# Patient Record
Sex: MALE | Race: WHITE | NOT HISPANIC OR LATINO | Employment: FULL TIME | URBAN - METROPOLITAN AREA
[De-identification: names, ages, dates, MRNs, and addresses within clinical notes are randomized per-mention and may not be internally consistent; named-entity substitution may affect disease eponyms.]

---
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
## 2022-02-20 ENCOUNTER — OFFICE VISIT (OUTPATIENT)
Dept: URGENT CARE | Facility: CLINIC | Age: 22
End: 2022-02-20
Payer: COMMERCIAL

## 2022-02-20 VITALS
BODY MASS INDEX: 22.19 KG/M2 | OXYGEN SATURATION: 93 % | DIASTOLIC BLOOD PRESSURE: 70 MMHG | RESPIRATION RATE: 16 BRPM | HEART RATE: 109 BPM | TEMPERATURE: 98.2 F | HEIGHT: 73 IN | WEIGHT: 167.4 LBS | SYSTOLIC BLOOD PRESSURE: 128 MMHG

## 2022-02-20 DIAGNOSIS — L03.314 CELLULITIS OF GROIN: ICD-10-CM

## 2022-02-20 PROCEDURE — 99213 OFFICE O/P EST LOW 20 MIN: CPT | Performed by: FAMILY MEDICINE

## 2022-02-20 RX ORDER — SULFAMETHOXAZOLE AND TRIMETHOPRIM 800; 160 MG/1; MG/1
1 TABLET ORAL 2 TIMES DAILY
Qty: 14 TABLET | Refills: 0 | Status: SHIPPED | OUTPATIENT
Start: 2022-02-20 | End: 2022-02-27

## 2022-02-20 ASSESSMENT — ENCOUNTER SYMPTOMS: FEVER: 0

## 2022-02-20 NOTE — PROGRESS NOTES
"Subjective:     Azam Myles is a 21 y.o. male who presents for Cyst (Cyst on pelvis and thigh.  The one on the pelvis area drained, but looks sore and possibly infected.)    HPI  Pt presents for evaluation of an acute problem  Pt with new \"cysts\" in his groin area   Had them appear 5 days ago   One did pop and drained dark red liquid   Has mild pain in the area     Review of Systems   Constitutional: Negative for fever.   Skin: Positive for rash.       PMH:  has no past medical history on file.  MEDS: No current outpatient medications on file.  ALLERGIES: No Known Allergies  SURGHX: No past surgical history on file.  SOCHX:  reports that he has never smoked. He has never used smokeless tobacco. He reports previous alcohol use. He reports that he does not use drugs.     Objective:   /70 (BP Location: Left arm, Patient Position: Sitting, BP Cuff Size: Adult)   Pulse (!) 109   Temp 36.8 °C (98.2 °F) (Temporal)   Resp 16   Ht 1.854 m (6' 1\")   Wt 75.9 kg (167 lb 6.4 oz)   SpO2 93%   BMI 22.09 kg/m²     Physical Exam  Constitutional:       General: He is not in acute distress.     Appearance: He is well-developed. He is not diaphoretic.   Pulmonary:      Effort: Pulmonary effort is normal.   Skin:     Comments: 2 small circular erythematous lesions which are each approximately 2 cm x 2 cm, 1 is in the left groin and the other is in the right anterior thigh.  Each has a central scabbed lesion and is without fluctuance or drainable abscess   Neurological:      Mental Status: He is alert.         Assessment/Plan:   Assessment    1. Cellulitis of groin  - sulfamethoxazole-trimethoprim (BACTRIM DS) 800-160 MG tablet; Take 1 Tablet by mouth 2 times a day for 7 days.  Dispense: 14 Tablet; Refill: 0    Patient with cellulitis of the groin.  Will treat with Bactrim as there is a chance this is MRSA.  Reviewed hygiene measures and other supportive care measures.  Follow-up as needed.    "

## 2022-03-01 ENCOUNTER — OFFICE VISIT (OUTPATIENT)
Dept: URGENT CARE | Facility: CLINIC | Age: 22
End: 2022-03-01
Payer: COMMERCIAL

## 2022-03-01 ENCOUNTER — HOSPITAL ENCOUNTER (OUTPATIENT)
Facility: MEDICAL CENTER | Age: 22
End: 2022-03-01
Attending: PHYSICIAN ASSISTANT
Payer: COMMERCIAL

## 2022-03-01 VITALS
HEIGHT: 73 IN | TEMPERATURE: 96.9 F | HEART RATE: 125 BPM | DIASTOLIC BLOOD PRESSURE: 62 MMHG | BODY MASS INDEX: 21.6 KG/M2 | OXYGEN SATURATION: 94 % | SYSTOLIC BLOOD PRESSURE: 96 MMHG | RESPIRATION RATE: 16 BRPM | WEIGHT: 163 LBS

## 2022-03-01 DIAGNOSIS — L02.91 ABSCESS: ICD-10-CM

## 2022-03-01 PROCEDURE — 10061 I&D ABSCESS COMP/MULTIPLE: CPT | Performed by: PHYSICIAN ASSISTANT

## 2022-03-01 PROCEDURE — 87077 CULTURE AEROBIC IDENTIFY: CPT

## 2022-03-01 PROCEDURE — 87070 CULTURE OTHR SPECIMN AEROBIC: CPT

## 2022-03-01 PROCEDURE — 87186 SC STD MICRODIL/AGAR DIL: CPT | Mod: 91

## 2022-03-01 PROCEDURE — 87205 SMEAR GRAM STAIN: CPT

## 2022-03-01 PROCEDURE — 99213 OFFICE O/P EST LOW 20 MIN: CPT | Mod: 25 | Performed by: PHYSICIAN ASSISTANT

## 2022-03-01 RX ORDER — DOXYCYCLINE HYCLATE 100 MG
100 TABLET ORAL 2 TIMES DAILY
Qty: 14 TABLET | Refills: 0 | Status: SHIPPED | OUTPATIENT
Start: 2022-03-01 | End: 2022-03-08

## 2022-03-02 DIAGNOSIS — L02.91 ABSCESS: ICD-10-CM

## 2022-03-02 LAB
GRAM STN SPEC: NORMAL
SIGNIFICANT IND 70042: NORMAL
SITE SITE: NORMAL
SOURCE SOURCE: NORMAL

## 2022-03-02 ASSESSMENT — ENCOUNTER SYMPTOMS
MYALGIAS: 0
VOMITING: 0
FEVER: 0
CHILLS: 0
NAUSEA: 0
HEADACHES: 0

## 2022-03-02 NOTE — PROGRESS NOTES
"Karan Myles is a 21 y.o. male who presents with Cyst (On bottom, x1.5weeks/Redness and a stinging pain/)            Cyst  This is a new problem. Episode onset: 1.5 weeks. Left buttock  The problem has been gradually worsening. Pertinent negatives include no chills, fever, headaches, myalgias, nausea or vomiting. Associated symptoms comments: Redness, swelling, pain in left buttock . Exacerbated by: sitting or palpation. Treatments tried: recently on antibiotics for two other cysts-  The treatment provided no relief.       No past medical history on file.    No past surgical history on file.    No family history on file.    No Known Allergies    Medications, Allergies, and current problem list reviewed today in Epic      Review of Systems   Constitutional: Negative for chills, fever and malaise/fatigue.   Gastrointestinal: Negative for nausea and vomiting.   Musculoskeletal: Negative for myalgias.   Skin:        Swelling, redness, abscess on left buttock    Neurological: Negative for headaches.          All other systems reviewed and are negative.       Objective     BP (!) 96/62   Pulse (!) 125   Temp 36.1 °C (96.9 °F) (Temporal)   Resp 16   Ht 1.854 m (6' 1\")   Wt 73.9 kg (163 lb)   SpO2 94%   BMI 21.51 kg/m²      Physical Exam  Constitutional:       General: He is not in acute distress.     Appearance: He is not ill-appearing.   HENT:      Head: Normocephalic and atraumatic.   Eyes:      Conjunctiva/sclera: Conjunctivae normal.   Cardiovascular:      Rate and Rhythm: Regular rhythm. Tachycardia present.   Pulmonary:      Effort: Pulmonary effort is normal. No respiratory distress.      Breath sounds: No wheezing.   Skin:         Neurological:      General: No focal deficit present.      Mental Status: He is alert and oriented to person, place, and time.   Psychiatric:         Mood and Affect: Mood is anxious.         Behavior: Behavior normal.         Thought Content: Thought content " "normal.         Judgment: Judgment normal.                Procedure: Incision and Drainage  -Risks, benefits, and alternatives discussed. Risks including infection, bleeding, nerve damage, and poor cosmetic outcome  -Sterile technique throughout  -Local anesthesia with 2% lidocaine with epinephrine  -Incision with #11 blade into fluctuant area with purulent material expressed  -Culture obtained and packaged for lab  -Cavity probed and any loculations bluntly taken down with hemostat  -Irrigated copiously with NS  -Packed with 1/4\" gauze  -Minimal bleeding with good hemostasis achieved  -The patient tolerated the procedure well                 Assessment & Plan        1. Abscess    I&D  - CULTURE WOUND W/ GRAM STAIN; Future  - doxycycline (VIBRAMYCIN) 100 MG Tab; Take 1 Tablet by mouth 2 times a day for 7 days.  Dispense: 14 Tablet; Refill: 0    Wound care discussed  Advised RTC in 2 days.    Differential diagnoses, Supportive care, and indications for immediate follow-up discussed with patient.   Pathogenesis of diagnosis discussed including typical length and natural progression.   Instructed to return to clinic or nearest emergency department for any change in condition, further concerns, or worsening of symptoms.    The patient demonstrated a good understanding and agreed with the treatment plan.    Antonella Mcginnis P.A.-C.                "

## 2022-03-03 ENCOUNTER — OFFICE VISIT (OUTPATIENT)
Dept: URGENT CARE | Facility: CLINIC | Age: 22
End: 2022-03-03
Payer: COMMERCIAL

## 2022-03-03 VITALS
OXYGEN SATURATION: 99 % | HEIGHT: 73 IN | BODY MASS INDEX: 21.6 KG/M2 | DIASTOLIC BLOOD PRESSURE: 82 MMHG | SYSTOLIC BLOOD PRESSURE: 130 MMHG | HEART RATE: 112 BPM | WEIGHT: 163 LBS | TEMPERATURE: 98.7 F | RESPIRATION RATE: 20 BRPM

## 2022-03-03 DIAGNOSIS — L02.91 ABSCESS: ICD-10-CM

## 2022-03-03 PROCEDURE — 99213 OFFICE O/P EST LOW 20 MIN: CPT | Performed by: NURSE PRACTITIONER

## 2022-03-03 ASSESSMENT — ENCOUNTER SYMPTOMS
CARDIOVASCULAR NEGATIVE: 1
GASTROINTESTINAL NEGATIVE: 1
PSYCHIATRIC NEGATIVE: 1
EYES NEGATIVE: 1
CONSTITUTIONAL NEGATIVE: 1
NEUROLOGICAL NEGATIVE: 1
ROS SKIN COMMENTS: ABSCESS
MUSCULOSKELETAL NEGATIVE: 1
RESPIRATORY NEGATIVE: 1

## 2022-03-03 NOTE — PROGRESS NOTES
"Subjective:   Azam Myles is a 21 y.o. male who presents for Follow-Up (Cyst x 1.5 weeks, FU)       HPI  Patient presents for evaluation of left buttock abscess.  Patient was seen in the urgent care on 3/1 for same abscess, had incision and drainage, was prescribed doxycycline.  Patient has taken doxycycline as prescribed without any difficulties.  Reports slight improvement in pain, has continued bloody drainage.  Denies fever or chills.    Review of Systems   Constitutional: Negative.    HENT: Negative.    Eyes: Negative.    Respiratory: Negative.    Cardiovascular: Negative.    Gastrointestinal: Negative.    Genitourinary: Negative.    Musculoskeletal: Negative.    Skin:        abscess    Neurological: Negative.    Psychiatric/Behavioral: Negative.    All other systems reviewed and are negative.      MEDS:   Current Outpatient Medications:   •  doxycycline (VIBRAMYCIN) 100 MG Tab, Take 1 Tablet by mouth 2 times a day for 7 days., Disp: 14 Tablet, Rfl: 0  ALLERGIES: No Known Allergies    Patient's PMH, SocHx, SurgHx, FamHx, Drug allergies and medications were reviewed.     Objective:   /82 (BP Location: Left arm, Patient Position: Sitting, BP Cuff Size: Adult long)   Pulse (!) 112   Temp 37.1 °C (98.7 °F) (Temporal)   Resp 20   Ht 1.854 m (6' 1\")   Wt 73.9 kg (163 lb)   SpO2 99%   BMI 21.51 kg/m²     Physical Exam  Vitals and nursing note reviewed.   Constitutional:       General: He is awake.      Appearance: Normal appearance. He is well-developed.   HENT:      Head: Normocephalic and atraumatic.      Right Ear: External ear normal.      Left Ear: External ear normal.      Nose: Nose normal.      Mouth/Throat:      Lips: Pink.      Mouth: Mucous membranes are moist.      Pharynx: Oropharynx is clear.   Eyes:      General: Lids are normal.      Extraocular Movements: Extraocular movements intact.      Conjunctiva/sclera: Conjunctivae normal.   Cardiovascular:      Rate and Rhythm: Normal rate and " regular rhythm.   Pulmonary:      Effort: Pulmonary effort is normal.   Musculoskeletal:         General: Normal range of motion.      Cervical back: Normal range of motion.   Skin:     General: Skin is warm and dry.      Findings: Abscess and erythema present.             Comments: Abscess as noted with slight incision, but approximate 3 cm annular in total size.  Large amount of packing removed with serosanguineous and bloody drainage.  No purulence present.   Neurological:      Mental Status: He is alert and oriented to person, place, and time.   Psychiatric:         Mood and Affect: Mood normal.         Behavior: Behavior normal. Behavior is cooperative.         Thought Content: Thought content normal.         Judgment: Judgment normal.         Assessment/Plan:   Assessment    1. Abscess  - Referral to General Surgery    Vital signs stable at today's acute urgent care visit.  Packing wound as described above.  Wound copiously irrigated and repacked.  Extra dressing supplies provided to patient.  Continue antibiotics as previously prescribed.  We will refer the patient to general surgery due to extensiveness of wound and undermining.      Advised the patient to follow-up with the primary care provider for recheck, reevaluation, and/or consideration of further management. Return to urgent care with any worsening/continued symptoms.  Red flags discussed and indications to immediately call 911 or present to the ED.  All questions were encouraged and answered to the patient's satisfaction and understanding, and they agree to the plan of care.     I personally reviewed prior external notes and test results pertinent to today's visit.  I have independently reviewed and interpreted all diagnostics ordered during this urgent care acute visit. Time spent evaluating this patient was a minimum of 20 minutes and includes preparing for visit, counseling/education, exam, evaluation, obtaining history, and ordering  lab/test/procedures.      Please note that this dictation was created using voice recognition software. I have made a reasonable attempt to correct obvious errors, but I expect that there are errors of grammar and possibly content that I did not discover before finalizing the note.

## 2022-03-05 ENCOUNTER — OFFICE VISIT (OUTPATIENT)
Dept: URGENT CARE | Facility: CLINIC | Age: 22
End: 2022-03-05
Payer: COMMERCIAL

## 2022-03-05 VITALS
BODY MASS INDEX: 22.13 KG/M2 | DIASTOLIC BLOOD PRESSURE: 82 MMHG | HEIGHT: 73 IN | RESPIRATION RATE: 18 BRPM | WEIGHT: 167 LBS | TEMPERATURE: 97.9 F | OXYGEN SATURATION: 99 % | SYSTOLIC BLOOD PRESSURE: 126 MMHG | HEART RATE: 101 BPM

## 2022-03-05 DIAGNOSIS — Z51.89 WOUND CHECK, ABSCESS: ICD-10-CM

## 2022-03-05 LAB
BACTERIA WND AEROBE CULT: ABNORMAL
GRAM STN SPEC: ABNORMAL
SIGNIFICANT IND 70042: ABNORMAL
SITE SITE: ABNORMAL
SOURCE SOURCE: ABNORMAL

## 2022-03-05 PROCEDURE — 99024 POSTOP FOLLOW-UP VISIT: CPT | Performed by: NURSE PRACTITIONER

## 2022-03-06 PROBLEM — R55 SYNCOPE AND COLLAPSE: Status: ACTIVE | Noted: 2022-03-06

## 2022-03-06 PROBLEM — J45.20: Status: ACTIVE | Noted: 2022-03-06

## 2022-03-06 PROBLEM — Z91.09 ENVIRONMENTAL ALLERGIES: Status: ACTIVE | Noted: 2022-03-06

## 2022-03-06 ASSESSMENT — ENCOUNTER SYMPTOMS
SORE THROAT: 0
DIZZINESS: 0
SHORTNESS OF BREATH: 0
EYE PAIN: 0
VOMITING: 0
FEVER: 0
CHILLS: 0
NAUSEA: 0
MYALGIAS: 0

## 2022-03-06 NOTE — PROGRESS NOTES
"Subjective:   Azam Myles is a 21 y.o. male who presents for Cyst (Left glute/Cyst follow up )      Wound Check  He was originally treated 3 to 5 days ago. Previous treatment included oral antibiotics and I&D of abscess. The maximum temperature noted was less than 100.4 F. The temperature was taken using a tympanic thermometer. There has been bloody discharge from the wound. The redness has improved. The swelling has improved. The pain has improved. He has no difficulty moving the affected extremity or digit.       Review of Systems   Constitutional: Negative for chills and fever.   HENT: Negative for sore throat.    Eyes: Negative for pain.   Respiratory: Negative for shortness of breath.    Cardiovascular: Negative for chest pain.   Gastrointestinal: Negative for nausea and vomiting.   Genitourinary: Negative for hematuria.   Musculoskeletal: Negative for myalgias.   Skin: Negative for rash.        Abscess left buttock with bandage in place and packing   Neurological: Negative for dizziness.       Medications:    • doxycycline Tabs    Allergies: Patient has no known allergies.    Problem List: Azam Myles does not have a problem list on file.    Surgical History:  No past surgical history on file.    Past Social Hx: Azam Myles  reports that he has never smoked. He has never used smokeless tobacco. He reports previous alcohol use. He reports that he does not use drugs.     Past Family Hx:  Azam Myles family history is not on file.     Problem list, medications, and allergies reviewed by myself today in Epic.     Objective:     /82 (BP Location: Left arm, Patient Position: Sitting)   Pulse (!) 101   Temp 36.6 °C (97.9 °F) (Temporal)   Resp 18   Ht 1.854 m (6' 1\")   Wt 75.8 kg (167 lb)   SpO2 99%   BMI 22.03 kg/m²     Physical Exam  Constitutional:       Appearance: Normal appearance. He is not ill-appearing or toxic-appearing.   HENT:      Head: Normocephalic.      Right Ear: External ear " normal.      Left Ear: External ear normal.      Nose: Nose normal.      Mouth/Throat:      Lips: Pink.      Mouth: Mucous membranes are moist.   Eyes:      General: Lids are normal.         Right eye: No discharge.         Left eye: No discharge.   Pulmonary:      Effort: Pulmonary effort is normal. No accessory muscle usage or respiratory distress.   Musculoskeletal:         General: Normal range of motion.      Cervical back: Full passive range of motion without pain.   Skin:     Coloration: Skin is not pale.      Findings: Abscess present.          Neurological:      Mental Status: He is alert and oriented to person, place, and time.   Psychiatric:         Mood and Affect: Mood normal.         Thought Content: Thought content normal.         Assessment/Plan:     Diagnosis and associated orders:     1. Wound check, abscess        Comments/MDM:     • Patient is a 21-year-old male present with the stated above, packing was removed, did irrigate wound with copious amounts of NS.  Did use 1 cc lidocaine with epi irrigated and the wound prior to repacking.  Patient will return to clinic in 48 hours for wound check.  Advised to continue taking prescribed antibiotics as directed as they are sensitive to bacteria Staphylococcus Lugdunensis and epidermidis which ws positive on culture.   • Differential diagnosis, natural history, supportive care, and indications for immediate follow-up discussed.                  Please note that this dictation was created using voice recognition software. I have made a reasonable attempt to correct obvious errors, but I expect that there are errors of grammar and possibly content that I did not discover before finalizing the note.    This note was electronically signed by Earl VEE

## 2022-03-07 ENCOUNTER — OFFICE VISIT (OUTPATIENT)
Dept: URGENT CARE | Facility: CLINIC | Age: 22
End: 2022-03-07
Payer: COMMERCIAL

## 2022-03-07 VITALS
TEMPERATURE: 97.6 F | DIASTOLIC BLOOD PRESSURE: 66 MMHG | OXYGEN SATURATION: 95 % | BODY MASS INDEX: 21.66 KG/M2 | WEIGHT: 163.4 LBS | RESPIRATION RATE: 16 BRPM | HEART RATE: 111 BPM | SYSTOLIC BLOOD PRESSURE: 124 MMHG | HEIGHT: 73 IN

## 2022-03-07 DIAGNOSIS — Z51.89 WOUND CHECK, ABSCESS: ICD-10-CM

## 2022-03-07 PROCEDURE — 99024 POSTOP FOLLOW-UP VISIT: CPT | Performed by: PHYSICIAN ASSISTANT

## 2022-03-07 ASSESSMENT — ENCOUNTER SYMPTOMS
CHILLS: 0
MYALGIAS: 0
VOMITING: 0
FEVER: 0
NAUSEA: 0

## 2022-03-07 NOTE — PROGRESS NOTES
"Subjective:   Azam Myles is a 21 y.o. male who presents for Follow-Up (Cyst packing left cheek, last here, swelling gone done, pain as gone down significantly x 3 days)      Wound Check  He was originally treated 5 to 10 days ago (I&D of abscess to left gluteal region.  Symptoms have greatly improved.  No pain.  No discharge.  No fevers, chills or myalgias.). Previous treatment included I&D of abscess, oral antibiotics and wound cleansing or irrigation. His temperature was unmeasured prior to arrival. There has been no drainage from the wound. There is no redness present. There is no swelling present. There is no pain present. He has no difficulty moving the affected extremity or digit.       Review of Systems   Constitutional: Negative for chills and fever.   Gastrointestinal: Negative for nausea and vomiting.   Musculoskeletal: Negative for myalgias.   Skin:        Healing abscess with I&D to left gluteal region       Medications:    • doxycycline Tabs    Allergies: Patient has no known allergies.    Problem List: Azam Myles does not have any pertinent problems on file.    Surgical History:  No past surgical history on file.    Past Social Hx: Azam Myles  reports that he has never smoked. He has never used smokeless tobacco. He reports previous alcohol use. He reports that he does not use drugs.     Past Family Hx:  Azam Myles family history is not on file.     Problem list, medications, and allergies reviewed by myself today in Epic.     Objective:     /66 (BP Location: Left arm, Patient Position: Sitting, BP Cuff Size: Adult)   Pulse (!) 111   Temp 36.4 °C (97.6 °F) (Temporal)   Resp 16   Ht 1.854 m (6' 1\")   Wt 74.1 kg (163 lb 6.4 oz)   SpO2 95%   BMI 21.56 kg/m²     Physical Exam  Constitutional:       General: He is not in acute distress.     Appearance: Normal appearance. He is not ill-appearing, toxic-appearing or diaphoretic.   HENT:      Head: Normocephalic and atraumatic. "   Eyes:      Conjunctiva/sclera: Conjunctivae normal.   Pulmonary:      Effort: Pulmonary effort is normal.   Musculoskeletal:      Cervical back: Normal range of motion.   Skin:     Comments: Abscess with healing incision to left gluteal region there is no surrounding redness.  Packing was removed in clinic with no additional purulent drainage or discharge present.  No surrounding induration.  No underlying fluctuance.   Neurological:      General: No focal deficit present.      Mental Status: He is alert and oriented to person, place, and time. Mental status is at baseline.           Assessment/Plan:     Comments/MDM:     • Packing removed in clinic.  Wound was thoroughly irrigated with normal saline.  Patient tolerated this well with no complications.  No underlying fluctuance present.  No discharge or drainage.  No surrounding redness or induration.  Continued wound care instructions discussed.  Sterile dressing applied.  Continue on current antibiotic regimen.  Follow-up for any worsening symptoms.     Diagnosis and associated orders:     1. Wound check, abscess              Differential diagnosis, natural history, supportive care, and indications for immediate follow-up discussed.    I personally reviewed prior external notes and test results pertinent to today's visit.     Advised the patient to follow-up with the primary care physician for recheck, reevaluation, and consideration of further management.    Please note that this dictation was created using voice recognition software. I have made reasonable attempt to correct obvious errors, but I expect that there are errors of grammar and possibly content that I did not discover before finalizing the note.    This note was electronically signed by JELANI Booker PA-C   Carlo CONNORS

## 2022-05-24 ENCOUNTER — TELEPHONE (OUTPATIENT)
Dept: SCHEDULING | Facility: IMAGING CENTER | Age: 22
End: 2022-05-24
Payer: COMMERCIAL

## 2022-06-07 ENCOUNTER — HOSPITAL ENCOUNTER (EMERGENCY)
Facility: MEDICAL CENTER | Age: 22
End: 2022-06-07
Payer: COMMERCIAL

## 2022-06-07 ENCOUNTER — OFFICE VISIT (OUTPATIENT)
Dept: MEDICAL GROUP | Facility: PHYSICIAN GROUP | Age: 22
End: 2022-06-07
Payer: COMMERCIAL

## 2022-06-07 VITALS
BODY MASS INDEX: 21.71 KG/M2 | DIASTOLIC BLOOD PRESSURE: 64 MMHG | HEART RATE: 92 BPM | WEIGHT: 163.8 LBS | RESPIRATION RATE: 16 BRPM | HEIGHT: 73 IN | TEMPERATURE: 97.9 F | SYSTOLIC BLOOD PRESSURE: 120 MMHG | OXYGEN SATURATION: 96 %

## 2022-06-07 DIAGNOSIS — R73.09 ELEVATED GLUCOSE: ICD-10-CM

## 2022-06-07 DIAGNOSIS — R35.0 URINARY FREQUENCY: ICD-10-CM

## 2022-06-07 DIAGNOSIS — R82.4 KETONURIA: ICD-10-CM

## 2022-06-07 PROBLEM — Z91.09 ENVIRONMENTAL ALLERGIES: Status: RESOLVED | Noted: 2022-03-06 | Resolved: 2022-06-07

## 2022-06-07 PROBLEM — J45.20: Status: RESOLVED | Noted: 2022-03-06 | Resolved: 2022-06-07

## 2022-06-07 LAB
APPEARANCE UR: CLEAR
BILIRUB UR STRIP-MCNC: NEGATIVE MG/DL
COLOR UR AUTO: YELLOW
GLUCOSE BLD-MCNC: NORMAL MG/DL (ref 70–100)
GLUCOSE UR STRIP.AUTO-MCNC: 1000 MG/DL
HBA1C MFR BLD: NORMAL % (ref ?–5.8)
INT CON NEG: NORMAL
INT CON POS: NORMAL
KETONES UR STRIP.AUTO-MCNC: 80 MG/DL
LEUKOCYTE ESTERASE UR QL STRIP.AUTO: NEGATIVE
NITRITE UR QL STRIP.AUTO: NEGATIVE
PH UR STRIP.AUTO: 5.5 [PH] (ref 5–8)
PROT UR QL STRIP: NEGATIVE MG/DL
RBC UR QL AUTO: NEGATIVE
SP GR UR STRIP.AUTO: 1
UROBILINOGEN UR STRIP-MCNC: 0.2 MG/DL

## 2022-06-07 PROCEDURE — 82962 GLUCOSE BLOOD TEST: CPT

## 2022-06-07 PROCEDURE — 83036 HEMOGLOBIN GLYCOSYLATED A1C: CPT

## 2022-06-07 PROCEDURE — 99214 OFFICE O/P EST MOD 30 MIN: CPT

## 2022-06-07 PROCEDURE — 81002 URINALYSIS NONAUTO W/O SCOPE: CPT

## 2022-06-07 ASSESSMENT — ENCOUNTER SYMPTOMS
PALPITATIONS: 0
DIAPHORESIS: 0
FLANK PAIN: 0
WEIGHT LOSS: 0
DOUBLE VISION: 0
BLURRED VISION: 0
CHILLS: 0
FOCAL WEAKNESS: 0
SPEECH CHANGE: 0
CONSTIPATION: 0
DIARRHEA: 0
FEVER: 0
ABDOMINAL PAIN: 0
VOMITING: 0
NAUSEA: 0
WEAKNESS: 0
SHORTNESS OF BREATH: 0
DIZZINESS: 0
SENSORY CHANGE: 0

## 2022-06-07 ASSESSMENT — PATIENT HEALTH QUESTIONNAIRE - PHQ9: CLINICAL INTERPRETATION OF PHQ2 SCORE: 0

## 2022-06-07 NOTE — PROGRESS NOTES
Subjective:     CC:  Diagnoses of Urinary frequency, Elevated glucose, and Ketonuria were pertinent to this visit.    HISTORY OF THE PRESENT ILLNESS: Patient is a 21 y.o. male. This pleasant patient is here today to establish care and discuss the following problems:    Problem   Urinary Frequency    6-month history of urinary frequency.  Patient drinks approximately 64+ ounces of water per day.  He does not report to be excessively thirsty.  He denies any previous history of urinary tract infection.  He reports to urinate over 10 times a day and about 5 times at night.  He is in a monogamous relationship and religiously uses condoms for sexual intercourse.  He does not endorse penile or testicular pain.  Denies dysuria however he has had 2 incidents of this since this condition began.  He does report that in the last 6 months he has had COVID-19, the flu, and gastroenteritis.  He does not endorse current polydipsia or polyphagia, no weakness or fatigue, no visual changes, or mental status changes.     Elevated Glucose    This is a new condition of uncertain prognosis.  Serum glucose collected in clinic too high to register on device.  Patient denies any polydipsia or polyphagia.  He is not tachycardic nor tachypneic.  Recent history of multiple viral illnesses in the past 6 months including COVID-19, influenza, and gastroenteritis.  Positive family history of diabetes in maternal grandmother and great-grandmother.  UA came back positive for greater than 1000 glucose and 80 mg of ketones per deciliter.     Ketonuria    This is a new condition of uncertain prognosis.  Positive ketonuria with 80 mg of ketones per deciliter collected in UA.  Glucose in urine above 1000.  POCT collected in clinic greater than meter could read.     Environmental Allergies (Resolved)   Pediatric Asthma, Mild Intermittent, Uncomplicated (Resolved)       Health Maintenance: We will bring back for health maintenance visit    ROS:   Review of  "Systems   Constitutional: Negative for chills, diaphoresis, fever, malaise/fatigue and weight loss.   Eyes: Negative for blurred vision and double vision.   Respiratory: Negative for shortness of breath.    Cardiovascular: Negative for chest pain and palpitations.   Gastrointestinal: Negative for abdominal pain, constipation, diarrhea, nausea and vomiting.   Genitourinary: Positive for frequency and urgency. Negative for dysuria, flank pain and hematuria.   Neurological: Negative for dizziness, sensory change, speech change, focal weakness and weakness.         Objective:     Exam: /64 (BP Location: Left arm, Patient Position: Sitting, BP Cuff Size: Adult)   Pulse 92   Temp 36.6 °C (97.9 °F) (Temporal)   Resp 16   Ht 1.842 m (6' 0.5\")   Wt 74.3 kg (163 lb 12.8 oz)   SpO2 96%  Body mass index is 21.91 kg/m².    Physical Exam  Constitutional:       General: He is not in acute distress.     Appearance: Normal appearance. He is normal weight. He is not ill-appearing or toxic-appearing.   HENT:      Head: Normocephalic and atraumatic.      Right Ear: There is impacted cerumen.      Left Ear: There is impacted cerumen.      Nose: Nose normal.      Mouth/Throat:      Mouth: Mucous membranes are moist.      Pharynx: Oropharynx is clear. No posterior oropharyngeal erythema.   Eyes:      Extraocular Movements: Extraocular movements intact.      Conjunctiva/sclera: Conjunctivae normal.      Pupils: Pupils are equal, round, and reactive to light.   Cardiovascular:      Rate and Rhythm: Normal rate and regular rhythm.      Pulses: Normal pulses.      Heart sounds: Normal heart sounds. No murmur heard.    No friction rub. No gallop.   Pulmonary:      Effort: Pulmonary effort is normal. No respiratory distress.      Breath sounds: Normal breath sounds.   Musculoskeletal:         General: Normal range of motion.      Cervical back: Normal range of motion and neck supple.   Lymphadenopathy:      Cervical: No cervical " adenopathy.   Skin:     General: Skin is warm and dry.      Capillary Refill: Capillary refill takes less than 2 seconds.   Neurological:      General: No focal deficit present.      Mental Status: He is alert and oriented to person, place, and time.   Psychiatric:         Mood and Affect: Mood normal.         Behavior: Behavior normal.       Labs: UA collected see HPI, POCT hemoglobin A1c and glucose too high to register on clinic device    Assessment & Plan: Medical Decision Making   21 y.o. male with the following -    Problem List Items Addressed This Visit     Urinary frequency     New condition of uncertain prognosis  - urine specimen collected in clinic shows glucose level over thousand, ketones 80 mg/dL, no blood, pH 5.5, protein negative, nitrite and leukoesterase negative.  -POCT hemoglobin A1c and glucose collected in clinic readings too high to register  -Urgent referral to ER           Relevant Orders    CBC WITHOUT DIFFERENTIAL    Comp Metabolic Panel    TSH WITH REFLEX TO FT4    VITAMIN D,25 HYDROXY    INSULIN AND C-PEPTIDE, SERUM    Referral to Endocrinology    POCT Hemoglobin A1C (Completed)    POCT Glucose (Completed)    POCT Urinalysis (Completed)    Elevated glucose     This is a new condition of uncertain prognosis  - POCT hemoglobin A1c and glucose too high for meter to read  - Urgent referral to emergency department           Relevant Orders    CBC WITHOUT DIFFERENTIAL    Comp Metabolic Panel    TSH WITH REFLEX TO FT4    VITAMIN D,25 HYDROXY    INSULIN AND C-PEPTIDE, SERUM    Referral to Endocrinology    POCT Hemoglobin A1C (Completed)    POCT Glucose (Completed)    Ketonuria     This is a new condition of uncertain prognosis  - Recommend urgent referral to ER for treatment           Relevant Orders    CBC WITHOUT DIFFERENTIAL    Comp Metabolic Panel    TSH WITH REFLEX TO FT4    VITAMIN D,25 HYDROXY    INSULIN AND C-PEPTIDE, SERUM    Referral to Endocrinology    POCT Hemoglobin A1C (Completed)     POCT Glucose (Completed)          Differential diagnosis, natural history, supportive care, and indications for immediate follow-up discussed.  Shared decision making approach was utilized, and patient is amendable with plan of care.  Patient understands to return to clinic or go to the emergency department if symptoms worsen. All questions and concerns addressed.      Return in about 2 weeks (around 6/21/2022) for Diabetes.    Please note that this dictation was created using voice recognition software. I have made every reasonable attempt to correct obvious errors, but I expect that there are errors of grammar and possibly content that I did not discover before finalizing the note.

## 2022-06-07 NOTE — ASSESSMENT & PLAN NOTE
New condition of uncertain prognosis  - urine specimen collected in clinic shows glucose level over thousand, ketones 80 mg/dL, no blood, pH 5.5, protein negative, nitrite and leukoesterase negative.  -POCT hemoglobin A1c and glucose collected in clinic readings too high to register  -Urgent referral to ER

## 2022-06-07 NOTE — ASSESSMENT & PLAN NOTE
This is a new condition of uncertain prognosis  - POCT hemoglobin A1c and glucose too high for meter to read  - Urgent referral to emergency department

## 2022-06-08 ENCOUNTER — HOSPITAL ENCOUNTER (OUTPATIENT)
Facility: MEDICAL CENTER | Age: 22
End: 2022-06-10
Attending: EMERGENCY MEDICINE | Admitting: STUDENT IN AN ORGANIZED HEALTH CARE EDUCATION/TRAINING PROGRAM
Payer: COMMERCIAL

## 2022-06-08 DIAGNOSIS — E11.9 DIABETES MELLITUS, NEW ONSET (HCC): ICD-10-CM

## 2022-06-08 PROBLEM — R73.9 HYPERGLYCEMIA: Status: ACTIVE | Noted: 2022-06-08

## 2022-06-08 LAB
ALBUMIN SERPL BCP-MCNC: 4.3 G/DL (ref 3.2–4.9)
ALBUMIN/GLOB SERPL: 1.6 G/DL
ALP SERPL-CCNC: 157 U/L (ref 30–99)
ALT SERPL-CCNC: 27 U/L (ref 2–50)
AMPHET UR QL SCN: NEGATIVE
ANION GAP SERPL CALC-SCNC: 19 MMOL/L (ref 7–16)
APPEARANCE UR: CLEAR
AST SERPL-CCNC: 17 U/L (ref 12–45)
BARBITURATES UR QL SCN: NEGATIVE
BASOPHILS # BLD AUTO: 1.3 % (ref 0–1.8)
BASOPHILS # BLD: 0.05 K/UL (ref 0–0.12)
BENZODIAZ UR QL SCN: NEGATIVE
BILIRUB SERPL-MCNC: 0.5 MG/DL (ref 0.1–1.5)
BILIRUB UR QL STRIP.AUTO: ABNORMAL
BUN SERPL-MCNC: 15 MG/DL (ref 8–22)
BZE UR QL SCN: NEGATIVE
CALCIUM SERPL-MCNC: 9.1 MG/DL (ref 8.4–10.2)
CANNABINOIDS UR QL SCN: NEGATIVE
CHLORIDE SERPL-SCNC: 98 MMOL/L (ref 96–112)
CO2 SERPL-SCNC: 20 MMOL/L (ref 20–33)
COLOR UR: YELLOW
CREAT SERPL-MCNC: 0.77 MG/DL (ref 0.5–1.4)
EOSINOPHIL # BLD AUTO: 0.06 K/UL (ref 0–0.51)
EOSINOPHIL NFR BLD: 1.5 % (ref 0–6.9)
ERYTHROCYTE [DISTWIDTH] IN BLOOD BY AUTOMATED COUNT: 40 FL (ref 35.9–50)
EST. AVERAGE GLUCOSE BLD GHB EST-MCNC: 444 MG/DL
GFR SERPLBLD CREATININE-BSD FMLA CKD-EPI: 132 ML/MIN/1.73 M 2
GLOBULIN SER CALC-MCNC: 2.7 G/DL (ref 1.9–3.5)
GLUCOSE BLD STRIP.AUTO-MCNC: 207 MG/DL (ref 65–99)
GLUCOSE SERPL-MCNC: 274 MG/DL (ref 65–99)
GLUCOSE UR STRIP.AUTO-MCNC: >=1000 MG/DL
HBA1C MFR BLD: 17.1 % (ref 4–5.6)
HCT VFR BLD AUTO: 43.6 % (ref 42–52)
HGB BLD-MCNC: 15.3 G/DL (ref 14–18)
IMM GRANULOCYTES # BLD AUTO: 0.01 K/UL (ref 0–0.11)
IMM GRANULOCYTES NFR BLD AUTO: 0.3 % (ref 0–0.9)
KETONES UR STRIP.AUTO-MCNC: >=80 MG/DL
LEUKOCYTE ESTERASE UR QL STRIP.AUTO: NEGATIVE
LIPASE SERPL-CCNC: 17 U/L (ref 7–58)
LYMPHOCYTES # BLD AUTO: 1.67 K/UL (ref 1–4.8)
LYMPHOCYTES NFR BLD: 42.5 % (ref 22–41)
MCH RBC QN AUTO: 33 PG (ref 27–33)
MCHC RBC AUTO-ENTMCNC: 35.1 G/DL (ref 33.7–35.3)
MCV RBC AUTO: 94 FL (ref 81.4–97.8)
METHADONE UR QL SCN: NEGATIVE
MICRO URNS: ABNORMAL
MONOCYTES # BLD AUTO: 0.32 K/UL (ref 0–0.85)
MONOCYTES NFR BLD AUTO: 8.1 % (ref 0–13.4)
NEUTROPHILS # BLD AUTO: 1.82 K/UL (ref 1.82–7.42)
NEUTROPHILS NFR BLD: 46.3 % (ref 44–72)
NITRITE UR QL STRIP.AUTO: NEGATIVE
NRBC # BLD AUTO: 0 K/UL
NRBC BLD-RTO: 0 /100 WBC
OPIATES UR QL SCN: NEGATIVE
OXYCODONE UR QL SCN: NEGATIVE
PCP UR QL SCN: NEGATIVE
PH UR STRIP.AUTO: 5 [PH] (ref 5–8)
PLATELET # BLD AUTO: 178 K/UL (ref 164–446)
PMV BLD AUTO: 10 FL (ref 9–12.9)
POTASSIUM SERPL-SCNC: 4.3 MMOL/L (ref 3.6–5.5)
PROPOXYPH UR QL SCN: NEGATIVE
PROT SERPL-MCNC: 7 G/DL (ref 6–8.2)
PROT UR QL STRIP: NEGATIVE MG/DL
RBC # BLD AUTO: 4.64 M/UL (ref 4.7–6.1)
RBC UR QL AUTO: NEGATIVE
SODIUM SERPL-SCNC: 137 MMOL/L (ref 135–145)
SP GR UR STRIP.AUTO: 1.02
WBC # BLD AUTO: 3.9 K/UL (ref 4.8–10.8)

## 2022-06-08 PROCEDURE — 36415 COLL VENOUS BLD VENIPUNCTURE: CPT

## 2022-06-08 PROCEDURE — 82010 KETONE BODYS QUAN: CPT

## 2022-06-08 PROCEDURE — 700105 HCHG RX REV CODE 258: Performed by: EMERGENCY MEDICINE

## 2022-06-08 PROCEDURE — 83036 HEMOGLOBIN GLYCOSYLATED A1C: CPT

## 2022-06-08 PROCEDURE — 84681 ASSAY OF C-PEPTIDE: CPT

## 2022-06-08 PROCEDURE — 96372 THER/PROPH/DIAG INJ SC/IM: CPT

## 2022-06-08 PROCEDURE — 80053 COMPREHEN METABOLIC PANEL: CPT

## 2022-06-08 PROCEDURE — 81003 URINALYSIS AUTO W/O SCOPE: CPT

## 2022-06-08 PROCEDURE — 700102 HCHG RX REV CODE 250 W/ 637 OVERRIDE(OP): Performed by: STUDENT IN AN ORGANIZED HEALTH CARE EDUCATION/TRAINING PROGRAM

## 2022-06-08 PROCEDURE — 80307 DRUG TEST PRSMV CHEM ANLYZR: CPT

## 2022-06-08 PROCEDURE — 83690 ASSAY OF LIPASE: CPT

## 2022-06-08 PROCEDURE — 85025 COMPLETE CBC W/AUTO DIFF WBC: CPT

## 2022-06-08 PROCEDURE — G0378 HOSPITAL OBSERVATION PER HR: HCPCS

## 2022-06-08 PROCEDURE — 700105 HCHG RX REV CODE 258: Performed by: STUDENT IN AN ORGANIZED HEALTH CARE EDUCATION/TRAINING PROGRAM

## 2022-06-08 PROCEDURE — 99219 PR INITIAL OBSERVATION CARE,LEVL II: CPT | Performed by: STUDENT IN AN ORGANIZED HEALTH CARE EDUCATION/TRAINING PROGRAM

## 2022-06-08 PROCEDURE — 99285 EMERGENCY DEPT VISIT HI MDM: CPT

## 2022-06-08 PROCEDURE — 700111 HCHG RX REV CODE 636 W/ 250 OVERRIDE (IP): Performed by: STUDENT IN AN ORGANIZED HEALTH CARE EDUCATION/TRAINING PROGRAM

## 2022-06-08 PROCEDURE — 82962 GLUCOSE BLOOD TEST: CPT

## 2022-06-08 RX ORDER — ONDANSETRON 4 MG/1
4 TABLET, ORALLY DISINTEGRATING ORAL EVERY 4 HOURS PRN
Status: DISCONTINUED | OUTPATIENT
Start: 2022-06-08 | End: 2022-06-10 | Stop reason: HOSPADM

## 2022-06-08 RX ORDER — PROMETHAZINE HYDROCHLORIDE 25 MG/1
12.5-25 TABLET ORAL EVERY 4 HOURS PRN
Status: DISCONTINUED | OUTPATIENT
Start: 2022-06-08 | End: 2022-06-10 | Stop reason: HOSPADM

## 2022-06-08 RX ORDER — BISACODYL 10 MG
10 SUPPOSITORY, RECTAL RECTAL
Status: DISCONTINUED | OUTPATIENT
Start: 2022-06-08 | End: 2022-06-10 | Stop reason: HOSPADM

## 2022-06-08 RX ORDER — ONDANSETRON 2 MG/ML
4 INJECTION INTRAMUSCULAR; INTRAVENOUS EVERY 4 HOURS PRN
Status: DISCONTINUED | OUTPATIENT
Start: 2022-06-08 | End: 2022-06-10 | Stop reason: HOSPADM

## 2022-06-08 RX ORDER — ENOXAPARIN SODIUM 100 MG/ML
40 INJECTION SUBCUTANEOUS DAILY
Status: DISCONTINUED | OUTPATIENT
Start: 2022-06-08 | End: 2022-06-10 | Stop reason: HOSPADM

## 2022-06-08 RX ORDER — SODIUM CHLORIDE 9 MG/ML
1000 INJECTION, SOLUTION INTRAVENOUS ONCE
Status: COMPLETED | OUTPATIENT
Start: 2022-06-08 | End: 2022-06-08

## 2022-06-08 RX ORDER — ACETAMINOPHEN 325 MG/1
650 TABLET ORAL EVERY 6 HOURS PRN
Status: DISCONTINUED | OUTPATIENT
Start: 2022-06-08 | End: 2022-06-10 | Stop reason: HOSPADM

## 2022-06-08 RX ORDER — INSULIN LISPRO 100 [IU]/ML
1-6 INJECTION, SOLUTION INTRAVENOUS; SUBCUTANEOUS EVERY 6 HOURS
Status: DISCONTINUED | OUTPATIENT
Start: 2022-06-08 | End: 2022-06-09

## 2022-06-08 RX ORDER — AMOXICILLIN 250 MG
2 CAPSULE ORAL 2 TIMES DAILY
Status: DISCONTINUED | OUTPATIENT
Start: 2022-06-08 | End: 2022-06-10 | Stop reason: HOSPADM

## 2022-06-08 RX ORDER — POLYETHYLENE GLYCOL 3350 17 G/17G
1 POWDER, FOR SOLUTION ORAL
Status: DISCONTINUED | OUTPATIENT
Start: 2022-06-08 | End: 2022-06-10 | Stop reason: HOSPADM

## 2022-06-08 RX ORDER — HYDRALAZINE HYDROCHLORIDE 20 MG/ML
10 INJECTION INTRAMUSCULAR; INTRAVENOUS EVERY 4 HOURS PRN
Status: DISCONTINUED | OUTPATIENT
Start: 2022-06-08 | End: 2022-06-10 | Stop reason: HOSPADM

## 2022-06-08 RX ORDER — SODIUM CHLORIDE, SODIUM LACTATE, POTASSIUM CHLORIDE, CALCIUM CHLORIDE 600; 310; 30; 20 MG/100ML; MG/100ML; MG/100ML; MG/100ML
INJECTION, SOLUTION INTRAVENOUS CONTINUOUS
Status: DISCONTINUED | OUTPATIENT
Start: 2022-06-08 | End: 2022-06-09

## 2022-06-08 RX ORDER — PROCHLORPERAZINE EDISYLATE 5 MG/ML
5-10 INJECTION INTRAMUSCULAR; INTRAVENOUS EVERY 4 HOURS PRN
Status: DISCONTINUED | OUTPATIENT
Start: 2022-06-08 | End: 2022-06-10 | Stop reason: HOSPADM

## 2022-06-08 RX ORDER — PROMETHAZINE HYDROCHLORIDE 25 MG/1
12.5-25 SUPPOSITORY RECTAL EVERY 4 HOURS PRN
Status: DISCONTINUED | OUTPATIENT
Start: 2022-06-08 | End: 2022-06-10 | Stop reason: HOSPADM

## 2022-06-08 RX ADMIN — SODIUM CHLORIDE, POTASSIUM CHLORIDE, SODIUM LACTATE AND CALCIUM CHLORIDE: 600; 310; 30; 20 INJECTION, SOLUTION INTRAVENOUS at 16:33

## 2022-06-08 RX ADMIN — INSULIN LISPRO 2 UNITS: 100 INJECTION, SOLUTION INTRAVENOUS; SUBCUTANEOUS at 18:13

## 2022-06-08 RX ADMIN — ENOXAPARIN SODIUM 40 MG: 40 INJECTION SUBCUTANEOUS at 18:13

## 2022-06-08 RX ADMIN — SODIUM CHLORIDE 1000 ML: 9 INJECTION, SOLUTION INTRAVENOUS at 12:17

## 2022-06-08 RX ADMIN — INSULIN GLARGINE-YFGN 10 UNITS: 100 INJECTION, SOLUTION SUBCUTANEOUS at 18:14

## 2022-06-08 ASSESSMENT — LIFESTYLE VARIABLES
TOTAL SCORE: 0
HAVE YOU EVER FELT YOU SHOULD CUT DOWN ON YOUR DRINKING: NO
HOW MANY TIMES IN THE PAST YEAR HAVE YOU HAD 5 OR MORE DRINKS IN A DAY: 0
TOTAL SCORE: 0
TOTAL SCORE: 0
ALCOHOL_USE: NO
HAVE PEOPLE ANNOYED YOU BY CRITICIZING YOUR DRINKING: NO
AVERAGE NUMBER OF DAYS PER WEEK YOU HAVE A DRINK CONTAINING ALCOHOL: 0
CONSUMPTION TOTAL: NEGATIVE
EVER FELT BAD OR GUILTY ABOUT YOUR DRINKING: NO
EVER HAD A DRINK FIRST THING IN THE MORNING TO STEADY YOUR NERVES TO GET RID OF A HANGOVER: NO
ON A TYPICAL DAY WHEN YOU DRINK ALCOHOL HOW MANY DRINKS DO YOU HAVE: 0

## 2022-06-08 ASSESSMENT — COGNITIVE AND FUNCTIONAL STATUS - GENERAL
DAILY ACTIVITIY SCORE: 24
SUGGESTED CMS G CODE MODIFIER MOBILITY: CH
MOBILITY SCORE: 24
SUGGESTED CMS G CODE MODIFIER DAILY ACTIVITY: CH

## 2022-06-08 ASSESSMENT — PATIENT HEALTH QUESTIONNAIRE - PHQ9
1. LITTLE INTEREST OR PLEASURE IN DOING THINGS: NOT AT ALL
2. FEELING DOWN, DEPRESSED, IRRITABLE, OR HOPELESS: NOT AT ALL
SUM OF ALL RESPONSES TO PHQ9 QUESTIONS 1 AND 2: 0

## 2022-06-08 ASSESSMENT — ENCOUNTER SYMPTOMS
GASTROINTESTINAL NEGATIVE: 1
MUSCULOSKELETAL NEGATIVE: 1
CONSTITUTIONAL NEGATIVE: 1
PSYCHIATRIC NEGATIVE: 1
WEAKNESS: 1
CARDIOVASCULAR NEGATIVE: 1
EYES NEGATIVE: 1
RESPIRATORY NEGATIVE: 1

## 2022-06-08 ASSESSMENT — PAIN DESCRIPTION - PAIN TYPE: TYPE: ACUTE PAIN

## 2022-06-08 NOTE — ED NOTES
Pt medicated as directed. IV WNL.   Side rail up x2 with call light in reach.  Mom at bedside.  Declined needs at present. Updated on plan of care.

## 2022-06-08 NOTE — DISCHARGE PLANNING
Met with pt and mom. Pt is a [employer] student. Independent with ADLs and IADLs. Pt will need diabetic education and set up with diabetic supplies. Voalte message to TORIBIO Shefali to look into what diabetic supplies are covered by insurance.     Mom is from [facility] and will be staying with son to help him.     Care Transition Team Assessment    Information Source  Orientation Level: Oriented X4  Information Given By: Patient  Who is responsible for making decisions for patient? : Patient    Readmission Evaluation  Is this a readmission?: No    Elopement Risk  Legal Hold: No    Interdisciplinary Discharge Planning  Does Admitting Nurse Feel This Could be a Complex Discharge?: No  Primary Care Physician: Brent Michelle DNP  Lives with - Patient's Self Care Capacity: Unrelated Adult  Patient or legal guardian wants to designate a caregiver: No  Support Systems: Parent  Housing / Facility: 1 Story Apartment / Condo  Do You Take your Prescribed Medications Regularly: No  Able to Return to Previous ADL's: Yes  Mobility Issues: No  Prior Services: None, Home-Independent  Patient Prefers to be Discharged to:: Home  Assistance Needed: No  Durable Medical Equipment: Not Applicable    Discharge Preparedness  What is your plan after discharge?: Home with help  What are your discharge supports?: Parent  Prior Functional Level: Ambulatory, Drives Self, Independent with Activities of Daily Living, Independent with Medication Management  Difficulity with ADLs: None  Difficulity with IADLs: None    Functional Assesment  Prior Functional Level: Ambulatory, Drives Self, Independent with Activities of Daily Living, Independent with Medication Management    Finances  Financial Barriers to Discharge: No  Prescription Coverage: Yes ([facility] on [address])    Vision / Hearing Impairment  Vision Impairment : No  Hearing Impairment : No    Advance Directive  Advance Directive?: None    Domestic Abuse  Have you ever been the victim of abuse or  violence?: No    Discharge Risks or Barriers  Discharge risks or barriers?: No  Patient risk factors: Other (comment)    Anticipated Discharge Information  Discharge Disposition: Discharged to home/self care (01)

## 2022-06-08 NOTE — ASSESSMENT & PLAN NOTE
Diabetic diet  Lantus dose increased from 10U to 18U  Insulin sliding scale  Frequent Accu-Cheks  A1c 17.1  Will need strict outpatient follow-up to continue work-up  Diabetes education pending

## 2022-06-08 NOTE — ED NOTES
Med rec updated and complete, per pt   Allergies reviewed, per pt   Interviewed pt with mother at bedside with permission from pt.  Pt reports no prescription medications, OTC's, or vitamins.  Pt reports no antibiotics in the last 30 days.

## 2022-06-08 NOTE — H&P
"Hospital Medicine History & Physical Note    Date of Service  6/8/2022    Primary Care Physician  Brent Michelle D.N.P.    Consultants  None    Code Status  Full Code    Chief Complaint  Chief Complaint   Patient presents with   • Sent by MD     Pt walk-in with mother. Per pt, went to PCP yesterday where he got \"finger pricks\" and a UA and the MD advised pt to come to ED for diabetic workup. Pt denies any complaints.       History of Presenting Illness  21-year-old male who was referred to the emergency department by primary care provider on 6/8/2022 for hyperglycemia.  Patient reports a 6-month history of polydipsia polyuria and episodes of dysuria.  No fever, chills, night sweats.  No orthostatic changes or loss of consciousness.      At the emergency department, vital signs stable.  CBC with WBC at 3.9.  Chemistry with hyperglycemia at 274, anion gap 19 and bicarbonate 20.  Alkaline phosphatase 157.  Urinalysis with glucosuria and ketonuria.  Patient was admitted to observation for new onset diabetes and hyperglycemia requiring insulin initiation and monitoring as patient is insulin naïve.    I discussed the plan of care with patient, family and bedside RN.    Review of Systems  Review of Systems   Constitutional: Negative.    HENT: Negative.    Eyes: Negative.    Respiratory: Negative.    Cardiovascular: Negative.    Gastrointestinal: Negative.    Genitourinary: Positive for frequency and urgency.   Musculoskeletal: Negative.    Skin: Negative.    Neurological: Positive for weakness.   Endo/Heme/Allergies: Negative.    Psychiatric/Behavioral: Negative.        Past Medical History   has a past medical history of Pediatric asthma, mild intermittent, uncomplicated, Pediatric asthma, mild intermittent, uncomplicated (03/06/2022), and Syncope.    Surgical History   has no past surgical history on file.     Family History  family history includes Diabetes in his maternal grandmother; No Known Problems in his father, " mother, and sister.     Social History   reports that he has never smoked. He has never used smokeless tobacco. He reports that he does not drink alcohol and does not use drugs.    Allergies  No Known Allergies    Medications  None       Physical Exam  Temp:  [36.8 °C (98.2 °F)-36.9 °C (98.5 °F)] 36.9 °C (98.5 °F)  Pulse:  [79-99] 79  Resp:  [16] 16  BP: (121-133)/(67-79) 133/72  SpO2:  [95 %-98 %] 95 %  Blood Pressure: 133/72   Temperature: 36.9 °C (98.5 °F)   Pulse: 79   Respiration: 16   Pulse Oximetry: 95 %       Physical Exam  Constitutional:       General: He is not in acute distress.     Appearance: Normal appearance.   HENT:      Head: Normocephalic and atraumatic.      Nose: Nose normal. No congestion.      Mouth/Throat:      Mouth: Mucous membranes are moist.   Eyes:      Extraocular Movements: Extraocular movements intact.      Pupils: Pupils are equal, round, and reactive to light.   Cardiovascular:      Rate and Rhythm: Normal rate and regular rhythm.      Pulses: Normal pulses.      Heart sounds: Normal heart sounds.   Pulmonary:      Effort: Pulmonary effort is normal.      Breath sounds: Normal breath sounds.   Abdominal:      General: Bowel sounds are normal.      Palpations: Abdomen is soft.      Tenderness: There is no abdominal tenderness.   Musculoskeletal:         General: No swelling. Normal range of motion.      Cervical back: Normal range of motion and neck supple.   Skin:     General: Skin is warm.      Coloration: Skin is not jaundiced.   Neurological:      General: No focal deficit present.      Mental Status: He is alert and oriented to person, place, and time. Mental status is at baseline.      Cranial Nerves: No cranial nerve deficit.   Psychiatric:         Mood and Affect: Mood normal.         Behavior: Behavior normal.         Thought Content: Thought content normal.         Judgment: Judgment normal.         Laboratory:  Recent Labs     06/08/22  1324   WBC 3.9*   RBC 4.64*    HEMOGLOBIN 15.3   HEMATOCRIT 43.6   MCV 94.0   MCH 33.0   MCHC 35.1   RDW 40.0   PLATELETCT 178   MPV 10.0     Recent Labs     06/08/22  1324   SODIUM 137   POTASSIUM 4.3   CHLORIDE 98   CO2 20   GLUCOSE 274*   BUN 15   CREATININE 0.77   CALCIUM 9.1     Recent Labs     06/08/22  1324   ALTSGPT 27   ASTSGOT 17   ALKPHOSPHAT 157*   TBILIRUBIN 0.5   LIPASE 17   GLUCOSE 274*         No results for input(s): NTPROBNP in the last 72 hours.      No results for input(s): TROPONINT in the last 72 hours.    Imaging:  No orders to display       Assessment/Plan:  Justification for Admission Status  I anticipate this patient is appropriate for observation status at this time because of below    * Hyperglycemia- (present on admission)  Assessment & Plan  Likely secondary to new onset diabetes  Pancreatic injury now ruled out, pending lipase may need imaging  Diabetic diet  Lantus  Insulin sliding scale  Frequent Accu-Cheks  Pending A1c and C-peptide  Will need strict outpatient follow-up to continue work-up      VTE prophylaxis: enoxaparin ppx

## 2022-06-08 NOTE — ED NOTES
IV started in LH with 22g x2 attempts. 1st attempt in LAC infiltrated. 2nd attempt - unable to draw labs. IV flushed with NS without difficulty. Pt tolerated well.  Lab to come draw pt.  Side rail up x1 with call light in reach. Mom at bedside.

## 2022-06-08 NOTE — ED NOTES
NS running open without difficulty. IV WNL.  Discussed plan of care with pt/mom. Verbalized understanding. Denies needs at present.

## 2022-06-08 NOTE — ED PROVIDER NOTES
"ED Provider Note    CHIEF COMPLAINT  Chief Complaint   Patient presents with   • Sent by MD     Pt walk-in with mother. Per pt, went to PCP yesterday where he got \"finger pricks\" and a UA and the MD advised pt to come to ED for diabetic workup. Pt denies any complaints.       HPI  Azam Myles is a 21 y.o. male who presents with hyperglycemia.  The patient states he went to his primary care doctor yesterday for a physical.  The patient was found to have an elevation in his blood sugar and he was sent here for evaluation.  He states he has been thirsty lately and he has been peeing more frequently.  He has not had any nausea or vomiting.  He has not had any fevers.  He states he is otherwise healthy.  He does not have any abdominal pain.    REVIEW OF SYSTEMS  See HPI for further details. All other systems are negative.     PAST MEDICAL HISTORY  Past Medical History:   Diagnosis Date   • Pediatric asthma, mild intermittent, uncomplicated 03/06/2022   • Pediatric asthma, mild intermittent, uncomplicated    • Syncope     childhood       FAMILY HISTORY  [unfilled]    SOCIAL HISTORY  Social History     Socioeconomic History   • Marital status: Single   Tobacco Use   • Smoking status: Never Smoker   • Smokeless tobacco: Never Used   Vaping Use   • Vaping Use: Never used   Substance and Sexual Activity   • Alcohol use: Never   • Drug use: Never   • Sexual activity: Yes     Partners: Female     Birth control/protection: Condom       SURGICAL HISTORY  No past surgical history on file.    CURRENT MEDICATIONS  Home Medications     Reviewed by Nina Shearer R.N. (Registered Nurse) on 06/08/22 at 1125  Med List Status: Complete   Medication Last Dose Status        Patient Anastacio Taking any Medications                       ALLERGIES  No Known Allergies    PHYSICAL EXAM  VITAL SIGNS: /79   Pulse 99   Temp 36.8 °C (98.2 °F) (Temporal)   Resp 16   Ht 1.854 m (6' 1\")   Wt 70.7 kg (155 lb 13.8 oz)   SpO2 97%   BMI 20.56 " kg/m²       Constitutional: Well developed, Well nourished, No acute distress, Non-toxic appearance.   HENT: Normocephalic, Atraumatic, Bilateral external ears normal, Oropharynx moist, No oral exudates, Nose normal.   Eyes: PERRLA, EOMI, Conjunctiva normal, No discharge.   Neck: Normal range of motion, No tenderness, Supple, No stridor.   Lymphatic: No lymphadenopathy noted.   Cardiovascular: Tachycardic heart rate, Normal rhythm, No murmurs, No rubs, No gallops.   Thorax & Lungs: Normal breath sounds, No respiratory distress, No wheezing, No chest tenderness.   Abdomen: Bowel sounds normal, Soft, No tenderness, No masses, No pulsatile masses.   Skin: Warm, Dry, No erythema, No rash.   Back: No tenderness, No CVA tenderness.   Extremities: Intact distal pulses, No edema, No tenderness, No cyanosis, No clubbing. .   Neurologic: Alert & oriented x 3, Normal motor function, Normal sensory function, No focal deficits noted.   Psychiatric: Affect normal, Judgment normal, Mood normal.     Results for orders placed or performed during the hospital encounter of 06/08/22   CBC w/ Differential   Result Value Ref Range    WBC 3.9 (L) 4.8 - 10.8 K/uL    RBC 4.64 (L) 4.70 - 6.10 M/uL    Hemoglobin 15.3 14.0 - 18.0 g/dL    Hematocrit 43.6 42.0 - 52.0 %    MCV 94.0 81.4 - 97.8 fL    MCH 33.0 27.0 - 33.0 pg    MCHC 35.1 33.7 - 35.3 g/dL    RDW 40.0 35.9 - 50.0 fL    Platelet Count 178 164 - 446 K/uL    MPV 10.0 9.0 - 12.9 fL    Neutrophils-Polys 46.30 44.00 - 72.00 %    Lymphocytes 42.50 (H) 22.00 - 41.00 %    Monocytes 8.10 0.00 - 13.40 %    Eosinophils 1.50 0.00 - 6.90 %    Basophils 1.30 0.00 - 1.80 %    Immature Granulocytes 0.30 0.00 - 0.90 %    Nucleated RBC 0.00 /100 WBC    Neutrophils (Absolute) 1.82 1.82 - 7.42 K/uL    Lymphs (Absolute) 1.67 1.00 - 4.80 K/uL    Monos (Absolute) 0.32 0.00 - 0.85 K/uL    Eos (Absolute) 0.06 0.00 - 0.51 K/uL    Baso (Absolute) 0.05 0.00 - 0.12 K/uL    Immature Granulocytes (abs) 0.01 0.00 -  0.11 K/uL    NRBC (Absolute) 0.00 K/uL   Complete Metabolic Panel (CMP)   Result Value Ref Range    Sodium 137 135 - 145 mmol/L    Potassium 4.3 3.6 - 5.5 mmol/L    Chloride 98 96 - 112 mmol/L    Co2 20 20 - 33 mmol/L    Anion Gap 19.0 (H) 7.0 - 16.0    Glucose 274 (H) 65 - 99 mg/dL    Bun 15 8 - 22 mg/dL    Creatinine 0.77 0.50 - 1.40 mg/dL    Calcium 9.1 8.4 - 10.2 mg/dL    AST(SGOT) 17 12 - 45 U/L    ALT(SGPT) 27 2 - 50 U/L    Alkaline Phosphatase 157 (H) 30 - 99 U/L    Total Bilirubin 0.5 0.1 - 1.5 mg/dL    Albumin 4.3 3.2 - 4.9 g/dL    Total Protein 7.0 6.0 - 8.2 g/dL    Globulin 2.7 1.9 - 3.5 g/dL    A-G Ratio 1.6 g/dL   LIPASE   Result Value Ref Range    Lipase 17 7 - 58 U/L   URINALYSIS    Specimen: Urine, Clean Catch   Result Value Ref Range    Color Yellow     Character Clear     Specific Gravity 1.025 <1.035    Ph 5.0 5.0 - 8.0    Glucose >=1000 (A) Negative mg/dL    Ketones >=80 (A) Negative mg/dL    Protein Negative Negative mg/dL    Bilirubin Small (A) Negative    Nitrite Negative Negative    Leukocyte Esterase Negative Negative    Occult Blood Negative Negative    Micro Urine Req see below    ESTIMATED GFR   Result Value Ref Range    GFR (CKD-EPI) 132 >60 mL/min/1.73 m 2         COURSE & MEDICAL DECISION MAKING  Pertinent Labs & Imaging studies reviewed. (See chart for details)  This a 21-year-old male who presents to the emergency department with polyuria, polydipsia, and an elevated blood sugar level at his primary care provider's office.  Laboratory analysis is consistent with new onset diabetes myelitis.  Therefore I have spoke with the hospitalist who will admit the patient for diabetic education and further treatment.  There was some discussion of the hospitalist consulting on the case and starting the patient on medication for outpatient treatment.  I suspect the patient will require admission.  The patient did receive a liter of fluid for resuscitation while in the emergency department at the  time of admission he is stable.    FINAL IMPRESSION  1.  New onset diabetes mellitus           Electronically signed by: Riley Lopez M.D., 6/8/2022 11:50 AM

## 2022-06-08 NOTE — ED TRIAGE NOTES
"Chief Complaint   Patient presents with   • Sent by MD     Pt walk-in with mother. Per pt, went to PCP yesterday where he got \"finger pricks\" and a UA and the MD advised pt to come to ED for diabetic workup. Pt denies any complaints.     /79   Pulse 99   Temp 36.8 °C (98.2 °F) (Temporal)   Resp 16   Ht 1.854 m (6' 1\")   Wt 70.7 kg (155 lb 13.8 oz)   SpO2 97% RA    Has this patient been vaccinated for COVID?  YES  If not, would they like to be vaccinated while in the ER if eligible?  -  Would the patient like to speak with the ERP about the possibility of receiving the COVID vaccine today before making a decision?  -   "

## 2022-06-09 PROBLEM — E11.9 DIABETES MELLITUS, NEW ONSET (HCC): Status: ACTIVE | Noted: 2022-06-08

## 2022-06-09 LAB
ALBUMIN SERPL BCP-MCNC: 3.8 G/DL (ref 3.2–4.9)
ALBUMIN/GLOB SERPL: 1.6 G/DL
ALP SERPL-CCNC: 109 U/L (ref 30–99)
ALT SERPL-CCNC: 23 U/L (ref 2–50)
ANION GAP SERPL CALC-SCNC: 14 MMOL/L (ref 7–16)
AST SERPL-CCNC: 18 U/L (ref 12–45)
BASOPHILS # BLD AUTO: 0.8 % (ref 0–1.8)
BASOPHILS # BLD: 0.04 K/UL (ref 0–0.12)
BILIRUB SERPL-MCNC: 0.4 MG/DL (ref 0.1–1.5)
BUN SERPL-MCNC: 13 MG/DL (ref 8–22)
CALCIUM SERPL-MCNC: 9.1 MG/DL (ref 8.4–10.2)
CHLORIDE SERPL-SCNC: 101 MMOL/L (ref 96–112)
CO2 SERPL-SCNC: 24 MMOL/L (ref 20–33)
CREAT SERPL-MCNC: 0.85 MG/DL (ref 0.5–1.4)
EOSINOPHIL # BLD AUTO: 0.11 K/UL (ref 0–0.51)
EOSINOPHIL NFR BLD: 2.1 % (ref 0–6.9)
ERYTHROCYTE [DISTWIDTH] IN BLOOD BY AUTOMATED COUNT: 41 FL (ref 35.9–50)
GFR SERPLBLD CREATININE-BSD FMLA CKD-EPI: 128 ML/MIN/1.73 M 2
GLOBULIN SER CALC-MCNC: 2.4 G/DL (ref 1.9–3.5)
GLUCOSE BLD STRIP.AUTO-MCNC: 215 MG/DL (ref 65–99)
GLUCOSE BLD STRIP.AUTO-MCNC: 215 MG/DL (ref 65–99)
GLUCOSE BLD STRIP.AUTO-MCNC: 238 MG/DL (ref 65–99)
GLUCOSE BLD STRIP.AUTO-MCNC: 239 MG/DL (ref 65–99)
GLUCOSE SERPL-MCNC: 184 MG/DL (ref 65–99)
HCT VFR BLD AUTO: 41.6 % (ref 42–52)
HGB BLD-MCNC: 14.4 G/DL (ref 14–18)
IMM GRANULOCYTES # BLD AUTO: 0.01 K/UL (ref 0–0.11)
IMM GRANULOCYTES NFR BLD AUTO: 0.2 % (ref 0–0.9)
LYMPHOCYTES # BLD AUTO: 3.25 K/UL (ref 1–4.8)
LYMPHOCYTES NFR BLD: 62.4 % (ref 22–41)
MCH RBC QN AUTO: 32.7 PG (ref 27–33)
MCHC RBC AUTO-ENTMCNC: 34.6 G/DL (ref 33.7–35.3)
MCV RBC AUTO: 94.5 FL (ref 81.4–97.8)
MONOCYTES # BLD AUTO: 0.37 K/UL (ref 0–0.85)
MONOCYTES NFR BLD AUTO: 7.1 % (ref 0–13.4)
NEUTROPHILS # BLD AUTO: 1.43 K/UL (ref 1.82–7.42)
NEUTROPHILS NFR BLD: 27.4 % (ref 44–72)
NRBC # BLD AUTO: 0 K/UL
NRBC BLD-RTO: 0 /100 WBC
PLATELET # BLD AUTO: 178 K/UL (ref 164–446)
PMV BLD AUTO: 10 FL (ref 9–12.9)
POTASSIUM SERPL-SCNC: 3.3 MMOL/L (ref 3.6–5.5)
PROT SERPL-MCNC: 6.2 G/DL (ref 6–8.2)
RBC # BLD AUTO: 4.4 M/UL (ref 4.7–6.1)
SODIUM SERPL-SCNC: 139 MMOL/L (ref 135–145)
WBC # BLD AUTO: 5.2 K/UL (ref 4.8–10.8)

## 2022-06-09 PROCEDURE — 700111 HCHG RX REV CODE 636 W/ 250 OVERRIDE (IP): Performed by: STUDENT IN AN ORGANIZED HEALTH CARE EDUCATION/TRAINING PROGRAM

## 2022-06-09 PROCEDURE — 80053 COMPREHEN METABOLIC PANEL: CPT

## 2022-06-09 PROCEDURE — 82962 GLUCOSE BLOOD TEST: CPT

## 2022-06-09 PROCEDURE — 36415 COLL VENOUS BLD VENIPUNCTURE: CPT

## 2022-06-09 PROCEDURE — 85025 COMPLETE CBC W/AUTO DIFF WBC: CPT

## 2022-06-09 PROCEDURE — 94760 N-INVAS EAR/PLS OXIMETRY 1: CPT

## 2022-06-09 PROCEDURE — 96372 THER/PROPH/DIAG INJ SC/IM: CPT

## 2022-06-09 PROCEDURE — 700105 HCHG RX REV CODE 258: Performed by: STUDENT IN AN ORGANIZED HEALTH CARE EDUCATION/TRAINING PROGRAM

## 2022-06-09 PROCEDURE — 99226 PR SUBSEQUENT OBSERVATION CARE,LEVEL III: CPT | Performed by: HOSPITALIST

## 2022-06-09 PROCEDURE — G0378 HOSPITAL OBSERVATION PER HR: HCPCS

## 2022-06-09 RX ORDER — DIPHENHYDRAMINE HYDROCHLORIDE 25 MG/1
CAPSULE, LIQUID FILLED ORAL
Qty: 1 KIT | Refills: 0 | Status: SHIPPED | OUTPATIENT
Start: 2022-06-09 | End: 2022-06-10 | Stop reason: SDUPTHER

## 2022-06-09 RX ORDER — INSULIN LISPRO 100 [IU]/ML
1-6 INJECTION, SOLUTION INTRAVENOUS; SUBCUTANEOUS EVERY 6 HOURS
Status: DISCONTINUED | OUTPATIENT
Start: 2022-06-09 | End: 2022-06-10 | Stop reason: HOSPADM

## 2022-06-09 RX ORDER — INSULIN LISPRO 100 [IU]/ML
1-6 INJECTION, SOLUTION INTRAVENOUS; SUBCUTANEOUS EVERY 6 HOURS
Status: DISCONTINUED | OUTPATIENT
Start: 2022-06-09 | End: 2022-06-09

## 2022-06-09 RX ORDER — INSULIN GLARGINE 100 [IU]/ML
18 INJECTION, SOLUTION SUBCUTANEOUS EVERY EVENING
Qty: 9 ML | Refills: 0 | Status: SHIPPED | OUTPATIENT
Start: 2022-06-09 | End: 2022-06-10 | Stop reason: SDUPTHER

## 2022-06-09 RX ADMIN — INSULIN LISPRO 2 UNITS: 100 INJECTION, SOLUTION INTRAVENOUS; SUBCUTANEOUS at 12:06

## 2022-06-09 RX ADMIN — INSULIN LISPRO 2 UNITS: 100 INJECTION, SOLUTION INTRAVENOUS; SUBCUTANEOUS at 00:03

## 2022-06-09 RX ADMIN — SODIUM CHLORIDE, POTASSIUM CHLORIDE, SODIUM LACTATE AND CALCIUM CHLORIDE: 600; 310; 30; 20 INJECTION, SOLUTION INTRAVENOUS at 00:08

## 2022-06-09 RX ADMIN — ENOXAPARIN SODIUM 40 MG: 40 INJECTION SUBCUTANEOUS at 18:10

## 2022-06-09 RX ADMIN — INSULIN LISPRO 2 UNITS: 100 INJECTION, SOLUTION INTRAVENOUS; SUBCUTANEOUS at 06:23

## 2022-06-09 RX ADMIN — INSULIN LISPRO 2 UNITS: 100 INJECTION, SOLUTION INTRAVENOUS; SUBCUTANEOUS at 18:06

## 2022-06-09 ASSESSMENT — ENCOUNTER SYMPTOMS
COUGH: 0
FEVER: 0
CHILLS: 0
PALPITATIONS: 0
VOMITING: 0
NAUSEA: 0
ABDOMINAL PAIN: 0
HEADACHES: 0
SHORTNESS OF BREATH: 0
DIZZINESS: 0

## 2022-06-09 ASSESSMENT — PAIN DESCRIPTION - PAIN TYPE
TYPE: ACUTE PAIN
TYPE: ACUTE PAIN

## 2022-06-09 NOTE — PROGRESS NOTES
Received from ER, aox4,  steady on his feet. Denies pain or sob. Call light within reach. Needs attended. Plan of care discussed and understood.

## 2022-06-09 NOTE — DISCHARGE PLANNING
note:  Notified Shefali MANRIQUE CDE. She advices that pt's diabetic prescriptions should be sent to [facility]. As the [facility]. pharmacist can assist with finding out what supplies and medications are covered by insurance. And pt will need diabetic education. Dr. Rick notified. Changed pharmacy in [facility].

## 2022-06-09 NOTE — DISCHARGE PLANNING
Case Management Discharge Planning    Admission Date: 6/8/2022  GMLOS:    ALOS: 0    6-Clicks ADL Score: 24  6-Clicks Mobility Score: 24      Anticipated Discharge Dispo: Discharge Disposition: Discharged to home/self care (01)    DME Needed: No    Action(s) Taken: Updated Provider/Nurse on Discharge Plan    Escalations Completed: None    Medically Clear: No    Next Steps: Medical clearance    Barriers to Discharge: Medical clearance

## 2022-06-09 NOTE — PROGRESS NOTES
4 Eyes Skin Assessment Completed with Romina.    Head WDL  Ears WDL  Nose WDL  Mouth WDL  Neck WDL  Breast/Chest WDL  Shoulder Blades WDL  Spine WDL  (R) Arm/Elbow/Hand WDL  (L) Arm/Elbow/Hand WDL  Abdomen WDL  Groin WDL  Scrotum/Coccyx/Buttocks WDL  (R) Leg WDL  (L) Leg WDL  (R) Heel/Foot/Toe WDL  (L) Heel/Foot/Toe WDL          Devices In Places Blood Pressure Cuff      Interventions In Place Pillows and Pressure Redistribution Mattress    Possible Skin Injury No    Pictures Uploaded Into Epic N/A  Wound Consult Placed N/A  RN Wound Prevention Protocol Ordered No

## 2022-06-09 NOTE — PROGRESS NOTES
Hospital Medicine Daily Progress Note    Date of Service  6/9/2022    Chief Complaint  Azam Myles is a 21 y.o. male admitted 6/8/2022 with hyperglycemia    Hospital Course  21-year-old male sent by PCP for hyperglycemia.  Patient was found to have glucose of 274 and started on insulin.  His A1c is 17.1.       Interval Problem Update  He is feeling well, his only symptom had been polyuria. He has family history of early onset DM  Awaiting diabetes education  I have sent prescriptions to [facility] to see what is covered by his insurance    I have personally seen and examined the patient at bedside. I discussed the plan of care with patient, family, bedside RN, charge RN,  and pharmacy.    Consultants/Specialty  None    Code Status  Full Code    Disposition  Patient is not medically cleared for discharge.   Anticipate discharge to to home with close outpatient follow-up.  I have placed the appropriate orders for post-discharge needs.    Review of Systems  Review of Systems   Constitutional: Negative for chills and fever.   Respiratory: Negative for cough and shortness of breath.    Cardiovascular: Negative for chest pain and palpitations.   Gastrointestinal: Negative for abdominal pain, nausea and vomiting.   Genitourinary: Negative for dysuria and urgency.   Neurological: Negative for dizziness and headaches.   All other systems reviewed and are negative.       Physical Exam  Temp:  [36.3 °C (97.4 °F)-37.1 °C (98.7 °F)] 36.3 °C (97.4 °F)  Pulse:  [61-99] 80  Resp:  [16-18] 18  BP: (118-135)/(59-79) 118/59  SpO2:  [94 %-98 %] 98 %    Physical Exam  Vitals and nursing note reviewed.   Constitutional:       Appearance: Normal appearance.   Cardiovascular:      Rate and Rhythm: Normal rate and regular rhythm.      Heart sounds: No murmur heard.  Pulmonary:      Effort: Pulmonary effort is normal. No respiratory distress.      Breath sounds: Normal breath sounds.   Neurological:      General:  No focal deficit present.      Mental Status: He is alert and oriented to person, place, and time.         Fluids  No intake or output data in the 24 hours ending 06/09/22 1054    Laboratory  Recent Labs     06/08/22  1324 06/09/22  0222   WBC 3.9* 5.2   RBC 4.64* 4.40*   HEMOGLOBIN 15.3 14.4   HEMATOCRIT 43.6 41.6*   MCV 94.0 94.5   MCH 33.0 32.7   MCHC 35.1 34.6   RDW 40.0 41.0   PLATELETCT 178 178   MPV 10.0 10.0     Recent Labs     06/08/22  1324 06/09/22  0222   SODIUM 137 139   POTASSIUM 4.3 3.3*   CHLORIDE 98 101   CO2 20 24   GLUCOSE 274* 184*   BUN 15 13   CREATININE 0.77 0.85   CALCIUM 9.1 9.1                   Imaging  No orders to display        Assessment/Plan  * Diabetes mellitus, new onset (HCC)- (present on admission)  Assessment & Plan  Diabetic diet  Lantus dose increased from 10U to 18U  Insulin sliding scale  Frequent Accu-Cheks  A1c 17.1  Will need strict outpatient follow-up to continue work-up  Diabetes education pending       VTE prophylaxis: enoxaparin ppx    I have performed a physical exam and reviewed and updated ROS and Plan today (6/9/2022). In review of yesterday's note (6/8/2022), there are no changes except as documented above.

## 2022-06-09 NOTE — CARE PLAN
The patient is Stable - Low risk of patient condition declining or worsening    Shift Goals  Clinical Goals: blood sugar will be within normal limits  Patient Goals: rest and sleep for more than 6 hrs  Family Goals: n/a    Progress made toward(s) clinical / shift goals:  progressing but glucose is still high    Patient is not progressing towards the following goals:

## 2022-06-09 NOTE — CARE PLAN
The patient is Stable - Low risk of patient condition declining or worsening    Shift Goals  Clinical Goals: blood sugar will be within normal limits  Patient Goals: rest and sleep for more than 6 hrs  Family Goals: n/a    Progress made toward(s) clinical / shift goals:  slept well through the night.    Patient is not progressing towards the following goals: blood sugar not in normal range yet, on insulin coverage, iv fluids. For diabetes education/ teaching.      Problem: Knowledge Deficit - Diabetes  Goal: Patient will demonstrate knowledge of insulin injection, symptoms, and treatment of hypoglycemia and diet prior to discharge  Outcome: Progressing     Problem: Diabetes Management  Goal: Patient will achieve and maintain glucose in satisfactory range  Outcome: in progress

## 2022-06-09 NOTE — ED NOTES
Pt medicated as directed.   Education provided to patient about different types of insulin and expectations. Instructed on what to expect/what to report to RN. Verbalized understanding.   Pt denied questions/concerns.  Sitting up in bed eating dinner. Denies needs at present.

## 2022-06-10 VITALS
BODY MASS INDEX: 20.66 KG/M2 | HEIGHT: 73 IN | TEMPERATURE: 97.7 F | DIASTOLIC BLOOD PRESSURE: 71 MMHG | HEART RATE: 64 BPM | WEIGHT: 155.87 LBS | OXYGEN SATURATION: 96 % | RESPIRATION RATE: 18 BRPM | SYSTOLIC BLOOD PRESSURE: 121 MMHG

## 2022-06-10 LAB
C PEPTIDE SERPL-MCNC: 0.4 NG/ML (ref 0.5–3.3)
GLUCOSE BLD STRIP.AUTO-MCNC: 162 MG/DL (ref 65–99)
GLUCOSE BLD STRIP.AUTO-MCNC: 199 MG/DL (ref 65–99)
GLUCOSE BLD STRIP.AUTO-MCNC: 218 MG/DL (ref 65–99)

## 2022-06-10 PROCEDURE — 82962 GLUCOSE BLOOD TEST: CPT | Mod: 91

## 2022-06-10 PROCEDURE — G0378 HOSPITAL OBSERVATION PER HR: HCPCS

## 2022-06-10 PROCEDURE — 99217 PR OBSERVATION CARE DISCHARGE: CPT | Performed by: HOSPITALIST

## 2022-06-10 PROCEDURE — 96372 THER/PROPH/DIAG INJ SC/IM: CPT

## 2022-06-10 RX ORDER — GLUCOSAMINE HCL/CHONDROITIN SU 500-400 MG
CAPSULE ORAL
Qty: 100 EACH | Refills: 0 | Status: SHIPPED | OUTPATIENT
Start: 2022-06-10

## 2022-06-10 RX ORDER — LANCETS 30 GAUGE
EACH MISCELLANEOUS
Qty: 100 EACH | Refills: 0 | Status: SHIPPED | OUTPATIENT
Start: 2022-06-10 | End: 2022-07-12 | Stop reason: SDUPTHER

## 2022-06-10 RX ORDER — INSULIN GLARGINE 100 [IU]/ML
18 INJECTION, SOLUTION SUBCUTANEOUS EVERY EVENING
Qty: 9 ML | Refills: 0 | Status: SHIPPED | OUTPATIENT
Start: 2022-06-10 | End: 2022-06-23 | Stop reason: SDUPTHER

## 2022-06-10 RX ORDER — DIPHENHYDRAMINE HYDROCHLORIDE 25 MG/1
CAPSULE, LIQUID FILLED ORAL
Qty: 1 KIT | Refills: 0 | Status: SHIPPED | OUTPATIENT
Start: 2022-06-10 | End: 2023-08-02

## 2022-06-10 RX ADMIN — INSULIN LISPRO 2 UNITS: 100 INJECTION, SOLUTION INTRAVENOUS; SUBCUTANEOUS at 00:12

## 2022-06-10 RX ADMIN — INSULIN LISPRO 1 UNITS: 100 INJECTION, SOLUTION INTRAVENOUS; SUBCUTANEOUS at 06:05

## 2022-06-10 RX ADMIN — INSULIN LISPRO 1 UNITS: 100 INJECTION, SOLUTION INTRAVENOUS; SUBCUTANEOUS at 12:14

## 2022-06-10 NOTE — PROGRESS NOTES
Assumed care of patient. Bedside report received from night shift RN. Patient is resting at this time. Call light is within reach and fall precautions are in place. Hourly rounding in place.

## 2022-06-10 NOTE — DISCHARGE INSTRUCTIONS
Discharge Instructions    Discharged to home by car with relative. Discharged via walking, hospital escort: Yes.  Special equipment needed: Not Applicable    Be sure to schedule a follow-up appointment with your primary care doctor or any specialists as instructed.     Discharge Plan:   Diet Plan: Discussed  Activity Level: Discussed  Confirmed Follow up Appointment: Patient to Call and Schedule Appointment  Confirmed Symptoms Management: Discussed  Medication Reconciliation Updated: Yes    I understand that a diet low in cholesterol, fat, and sodium is recommended for good health. Unless I have been given specific instructions below for another diet, I accept this instruction as my diet prescription.   Other diet: Regular / Diabetic    Special Instructions: None    Is patient discharged on Warfarin / Coumadin?   No     Depression / Suicide Risk    As you are discharged from this [facility] facility, it is important to learn how to keep safe from harming yourself.    Recognize the warning signs:  Abrupt changes in personality, positive or negative- including increase in energy   Giving away possessions  Change in eating patterns- significant weight changes-  positive or negative  Change in sleeping patterns- unable to sleep or sleeping all the time   Unwillingness or inability to communicate  Depression  Unusual sadness, discouragement and loneliness  Talk of wanting to die  Neglect of personal appearance   Rebelliousness- reckless behavior  Withdrawal from people/activities they love  Confusion- inability to concentrate     If you or a loved one observes any of these behaviors or has concerns about self-harm, here's what you can do:  Talk about it- your feelings and reasons for harming yourself  Remove any means that you might use to hurt yourself (examples: pills, rope, extension cords, firearm)  Get professional help from the community (Mental Health, Substance Abuse, psychological counseling)  Do not be  alone:Call your Safe Contact- someone whom you trust who will be there for you.  Call your local CRISIS HOTLINE 744-8186 or 110-115-0506  Call your local Children's Mobile Crisis Response Team [place] (125) 392-9691 or www.BigFix  Call the toll free National Suicide Prevention Hotlines   National Suicide Prevention Lifeline 153-083-FNPU (1919)  [facility] 800-SUICIDE (559-4755)            Diabetes and Exercise  Diabetes mellitus is a common, chronic disease, in which the pancreas is unable to adequately control blood glucose (sugar) levels. There are 2 types of diabetes. Type 1 diabetes patients are unable to produce insulin, a hormone that causes sugar in the blood to be stored in the body. People with type 1 diabetes may compensate by giving themselves injections of insulin. Type 2 diabetes involves not producing adequate amounts of insulin to control blood glucose levels. People with type 2 diabetes control their blood glucose by monitoring their food intake or by taking medicine. Exercise is an important part of diabetes treatment. During exercise, the muscles use a greater amount of glucose from the blood for energy. This lowers your blood glucose, which is the same effect you would get from taking insulin. It has been shown that endurance athletes are more sensitive to insulin than inactive people.  SYMPTOMS   Many people with a mild case of diabetes have no symptoms. However, if left uncontrolled, diabetes can lead to several complications that could be prevented with treatment of the disease.  General symptoms of diabetes include:   Frequent urination (polyuria).  Frequent thirst and drinking (polydipsia).  Increased food consumption (polyphagia).  Fatigue.  Poor exercise performance.  Blurred vision.  Inflammation of the vagina (vaginitis) caused by fungal infections.  Skin infections (uncommon).  Numbness in the feet, caused by nerve injury.  Kidney disease.  CAUSES   The  cause of most cases of diabetes is unknown. In children, diabetes is often due to an autoimmune response to the cells in the pancreas that make insulin. It is also linked with other diseases, such as cystic fibrosis. Diabetes may have a genetic link.  PREVENTION  Athletes should strive to begin exercise with blood glucose in a well-controlled state.  Feet should always be kept clean and dry.  Activities in which low blood sugar levels cannot be treated easily (scuba diving, rock climbing, swimming) should be avoided.  Anticipate alterations in diet or training to avoid low blood sugar (hypoglycemia) and high blood sugar (hyperglycemia).  Athletes should try to increase sugar consumption after strenuous exercise to avoid hypoglycemia.  Short-acting insulin should not be injected into an actively exercising muscle. The athlete should rest the injection site for about 1 hour after exercise.  Patients with diabetes should get routine checkups of the feet to prevent complications.  PROGNOSIS   Exercise provides many benefits to the person with diabetes:   Reduced body fat.  Lower blood pressure.  Often, reduced need for medicines.  Improved exercise tolerance.  Lower insulin levels.  Weight loss.  Improved lipid profile (decreased cholesterol and low-density lipoproteins).  RELATED COMPLICATIONS   If performed incorrectly, exercise can result in complications of diabetes:   Poor control of blood sugar, when exercise is performed at the wrong time.  Increase in renal disease, from loss of body fluids (dehydration).  Increased risk of nerve injury (neuropathy) when performing exercises that increase foot injury.  Increased risk of eye problems when performing activities that involve breath holding or lowering or jarring the head.  Increased risk of sudden death from exercise in patients with heart disease.  Worsening of hypertension with heavy lifting (more than 10 lb/4.5 kg). Altered blood glucose and insulin dose as a  result of mild illness that produces loss of appetite.  Altered uptake of insulin after injection when insulin injection site is changed.  NOTE: Exercise can lower blood glucose effectively, but the effects are short-lasting (no more than a couple of days). Exercise has been shown to improve your sensitivity to insulin. This may alter how your body responds to a given dose of injected insulin. It is important for every patient with diabetes to know how his or her body may react to exercise, and to adjust insulin dosages accordingly.  TREATMENT  Eat about 1 to 3 hours before exercise.  Check blood glucose immediately before and after exercise.  Stop exercise if blood glucose is more than 250 mg/dL.  Stop exercise if blood glucose is less than 100 mg/dL.  Do not exercise within 1 hour of an insulin injection.  Be prepared to treat low blood glucose while exercising. Keep some sugar product with you, such as a candy bar.  For prolonged exercise, use a sports drink to maintain your glucose level.  Replace used-up glucose in the body after exercise.  Consume fluids during and after exercise to avoid dehydration.  SEEK MEDICAL CARE IF:  You have vision changes after a run.  You notice a loss of sensation in your feet after exercise.  You have increased numbness, tingling, or pins and needles sensations after exercise.  You have chest pain during or after exercise.  You have a fast, irregular heartbeat (palpitations) during or after exercise.  Your exercise tolerance gets worse.  You have fainting or dizzy spells for brief periods during or after exercise.     This information is not intended to replace advice given to you by your health care provider. Make sure you discuss any questions you have with your health care provider.     Document Released: 12/18/2006 Document Revised: 01/08/2016 Document Reviewed: 02/16/2016  [employer] Interactive Patient Education ©2016 [employer]          Diabetes, Keeping Your Heart and  Blood Vessels Healthy  Too much glucose (sugar) in the blood for a long period of time can cause problems for those who have diabetes. This high blood glucose can damage many parts of the body, such as the heart, blood vessels, eyes, and kidneys. Heart and blood vessel disease can lead to heart attacks and strokes, which is the leading cause of death for people with diabetes. There are many things you can to do slow down or prevent the complications from diabetes.  WHAT DO MY HEART AND BLOOD VESSELS DO?  Your heart and blood vessels make up your circulatory system. Your heart is a big muscle that pumps blood through your body. Your heart pumps blood carrying oxygen to large blood vessels (arteries) and small blood vessels (capillaries). Other blood vessels, called veins, carry blood back to the heart.  HOW DO MY BLOOD VESSELS GET CLOGGED?  Several things, including having diabetes, can make your blood cholesterol level too high. Cholesterol is a substance that is made by the body and used for many important functions. It is also found in some food that comes from animals. When cholesterol is too high, the insides of large blood vessels become narrowed, even clogged. Narrowed and clogged blood vessels make it harder for enough blood to get to all parts of your body. This problem is called atherosclerosis.  WHAT CAN HAPPEN WHEN BLOOD VESSELS ARE CLOGGED?  When arteries become narrowed and clogged, you may have heart problems and are at increased risk for heart attack and stroke:  Chest pain (angina) causes pain in your chest, arms, shoulders, or back. You may feel the pain more when your heart beats faster, such as when you exercise. The pain may go away when you rest. You also may feel very weak and sweaty. If you do not get treatment, chest pain may happen more often. If diabetes has damaged the heart nerves, you may not feel the chest pain.   Heart attack. A heart attack happens when a blood vessel in or near the  heart becomes blocked. Not enough blood can get to that part of the heart muscle so the area becomes oxygen deprived and the heart muscle may be permanently damaged.   WHAT ARE THE WARNING SIGNS OF A HEART ATTACK?  You may have one or more of the following warning signs:  Chest pain or discomfort.   Pain or discomfort in your arms, back, jaw, or neck.   Indigestion or stomach pain.   Shortness of breath.   Sweating.   Nausea or vomiting.   Light-headedness.   You may have no warning signs at all, or they may come and go.   HOW DOES HEART DISEASE CAUSE HIGH BLOOD PRESSURE?  Narrowed blood vessels leave a smaller opening for blood to flow through. It is like turning on a garden hose and holding your thumb over the opening. The smaller opening makes the water shoot out with more pressure. In the same way, narrowed blood vessels lead to high blood pressure. Other factors, such as kidney problems and being overweight, can also lead to high blood pressure.  Many people with diabetes also have high blood pressure. If you have heart, eye, or kidney problems from diabetes, high blood pressure can make them worse.  If you have high blood pressure, ask your caregiver how to lower it. Your caregiver may be asked to take blood pressure medicine every day. Some types of blood pressure medicine can also help keep your kidneys healthy.  To lower your blood pressure, your may be asked to lose weight; eat more fruits and vegetables; eat less salt and high-sodium foods, such as canned soups, luncheon meats, salty snack foods, and fast foods; and drink less alcohol.  WHAT ARE THE WARNING SIGNS OF A STROKE?  A stroke happens when part of your brain is not getting enough blood and stops working. Depending on the part of the brain that is damaged, a stroke can cause:  Sudden weakness or numbness of your face, arm, or leg on one side of your body.   Sudden confusion, trouble talking, or trouble understanding.   Sudden dizziness, loss of  "balance, or trouble walking.   Sudden trouble seeing in one or both eyes or sudden double vision.   Sudden severe headache.   Sometimes, one or more of these warning signs may happen and then disappear. You might be having a \"mini-stroke,\" also called a TIA (transient ischemic attack). If you have any of these warning signs, tell your caregiver right away.  HOW CAN CLOGGED BLOOD VESSELS HURT MY LEGS AND FEET?  Peripheral vascular disease can happen when the openings in your blood vessels become narrow and not enough blood gets to your legs and feet. You may feel pain in your buttocks, the back of your legs, or your thighs when you stand, walk, or exercise. Sometimes, surgery is necessary to treat this problem.  WHAT CAN I DO TO KEEP MY BLOOD VESSELS HEALTHY AND PREVENT HEART DISEASE AND STROKE?  Keep your blood glucose under control. An A1c blood test will probably be ordered by your caregiver at least twice a year. The A1c test tells you your average blood glucose for the past 2 to 3 months and can give valuable information on the overall control of your diabetes.   Keep your blood pressure under control. Have it checked at every health care visit. If you are on medication to control blood pressure, take it exactly as prescribed. The target for most people is below 130/80.   Keep your cholesterol under control. Have it checked at least once a year. The targets for most people are:   LDL (bad) cholesterol: below 100 mg/dl.   HDL (good) cholesterol: above 40 mg/dl in men and above 50 mg/dl in women.   Triglycerides (another type of fat in the blood): below 150 mg/dl.   Make physical activity a part of your daily routine. Aim for at least 30 minutes of exercise most days of the week. Check with your caregiver to learn what activities are best for you.   Make sure that the foods you eat are \"heart-healthy.\" Include foods high in fiber, such as oat bran, oatmeal, whole-grain breads and cereals, fruits, and vegetables. " "Cut back on foods high in saturated fat or cholesterol, such as meats, butter, dairy products with fat, eggs, shortening, lard, and foods with palm oil or coconut oil.   Maintain a healthy weight. If you are overweight, try to exercise most days of the week. See a registered dietitian for help in planning meals and lowering the fat and calorie content.   If you smoke, QUIT. Your caregiver can tell you about ways to help you quit smoking.   Ask your caregiver whether you should take an aspirin every day. Studies have shown that taking a low dose of aspirin every day can help reduce your risk of heart disease and stroke.   Take your medicines as directed.   SEEK MEDICAL CARE IF:   You have any of the warning signs of a heart attack or stroke.   If you have pain in your legs or feet when walking.   If your feet and legs are cool or cold to the touch.   FOR MORE INFORMATION   Diabetes educators (nurses, dietitians, pharmacists, and other health professionals).   To find a diabetes educator near you, call the American Association of Diabetes Educators (AADE) toll-free at 9-370-ARNHDK2 (1-251.707.1091), or look on the Internet at www.diabeteseducator.org and click on \"Find a Diabetes Educator.\"   Dietitians.   To find a dietitian near you, call the American Dietetic Association toll-free at 1-460.105.4253, or look on the Internet at www.eatright.org and click on \"Find a Nutrition Professional.\"   Government.   The National Heart, Lung, and Blood [place] (NHLBI) is part of the National Institutes of Health. To learn more about heart and blood vessel problems, write or call NHLBI Information Center, [address] [address], [place] [address], [phone]; or see www.nhlbi.nih.gov on the Internet.   To get more information about taking care of diabetes, contact:   National Diabetes Information Clearinghouse  [address] [place] [address]  Phone: 1-569.970.8495 or [phone]) [phone]  Fax: [phone]  Email: " "ndic@info.niddk.nih.gov  Internet: www.diabetes.niddk.nih.gov  National Diabetes Education Program  [address] [place] [address]  Phone: [phone]  Fax: [phone]  Internet: http://nde.nih.gov  American Diabetes Association  [address] [place] [address]  Phone: 1-182.191.9606  Internet: www.diabetes.org  Juvenile Diabetes Research Foundation Int[facility]l  [address] [address]  Phone: 1-131.766.6544  Internet: www.jdrf.org  Document Released: 12/20/2004 Document Revised: 03/11/2013 Document Reviewed: 05/28/2010  ExitCare® Patient Information ©2013 [employer].          Carbohydrate Counting for Diabetes Mellitus, Adult    Carbohydrate counting is a method of keeping track of how many carbohydrates you eat. Eating carbohydrates naturally increases the amount of sugar (glucose) in the blood. Counting how many carbohydrates you eat helps keep your blood glucose within normal limits, which helps you manage your diabetes (diabetes mellitus).  It is important to know how many carbohydrates you can safely have in each meal. This is different for every person. A diet and nutrition specialist (registered dietitian) can help you make a meal plan and calculate how many carbohydrates you should have at each meal and snack.  Carbohydrates are found in the following foods:  Grains, such as breads and cereals.  Dried beans and soy products.  Starchy vegetables, such as potatoes, peas, and corn.  Fruit and fruit juices.  Milk and yogurt.  Sweets and snack foods, such as cake, cookies, candy, chips, and soft drinks.  How do I count carbohydrates?  There are two ways to count carbohydrates in food. You can use either of the methods or a combination of both.  Reading \"Nutrition Facts\" on packaged food  The \"Nutrition Facts\" list is included on the labels of almost all packaged foods and beverages in the [place] It includes:  The serving size.  Information about nutrients in each " "serving, including the grams (g) of carbohydrate per serving.  To use the “Nutrition Facts\":  Decide how many servings you will have.  Multiply the number of servings by the number of carbohydrates per serving.  The resulting number is the total amount of carbohydrates that you will be having.  Learning standard serving sizes of other foods  When you eat carbohydrate foods that are not packaged or do not include \"Nutrition Facts\" on the label, you need to measure the servings in order to count the amount of carbohydrates:  Measure the foods that you will eat with a food scale or measuring cup, if needed.  Decide how many standard-size servings you will eat.  Multiply the number of servings by 15. Most carbohydrate-rich foods have about 15 g of carbohydrates per serving.  For example, if you eat 8 oz (170 g) of strawberries, you will have eaten 2 servings and 30 g of carbohydrates (2 servings x 15 g = 30 g).  For foods that have more than one food mixed, such as soups and casseroles, you must count the carbohydrates in each food that is included.  The following list contains standard serving sizes of common carbohydrate-rich foods. Each of these servings has about 15 g of carbohydrates:  ½ hamburger bun or ½ English muffin.  ½ oz (15 mL) syrup.  ½ oz (14 g) jelly.  1 slice of bread.  1 six-inch tortilla.  3 oz (85 g) cooked rice or pasta.  4 oz (113 g) cooked dried beans.  4 oz (113 g) starchy vegetable, such as peas, corn, or potatoes.  4 oz (113 g) hot cereal.  4 oz (113 g) mashed potatoes or ¼ of a large baked potato.  4 oz (113 g) canned or frozen fruit.  4 oz (120 mL) fruit juice.  4-6 crackers.  6 chicken nuggets.  6 oz (170 g) unsweetened dry cereal.  6 oz (170 g) plain fat-free yogurt or yogurt sweetened with artificial sweeteners.  8 oz (240 mL) milk.  8 oz (170 g) fresh fruit or one small piece of fruit.  24 oz (680 g) popped popcorn.  Example of carbohydrate counting  Sample meal  3 oz (85 g) chicken " "breast.  6 oz (170 g) brown rice.  4 oz (113 g) corn.  8 oz (240 mL) milk.  8 oz (170 g) strawberries with sugar-free whipped topping.  Carbohydrate calculation  Identify the foods that contain carbohydrates:  Rice.  Corn.  Milk.  Strawberries.  Calculate how many servings you have of each food:  2 servings rice.  1 serving corn.  1 serving milk.  1 serving strawberries.  Multiply each number of servings by 15  servings rice x 15 g = 30 g.  1 serving corn x 15 g = 15 g.  1 serving milk x 15 g = 15 g.  1 serving strawberries x 15 g = 15 g.  Add together all of the amounts to find the total grams of carbohydrates eaten:  30 g + 15 g + 15 g + 15 g = 75 g of carbohydrates total.  Summary  Carbohydrate counting is a method of keeping track of how many carbohydrates you eat.  Eating carbohydrates naturally increases the amount of sugar (glucose) in the blood.  Counting how many carbohydrates you eat helps keep your blood glucose within normal limits, which helps you manage your diabetes.  A diet and nutrition specialist (registered dietitian) can help you make a meal plan and calculate how many carbohydrates you should have at each meal and snack.  This information is not intended to replace advice given to you by your health care provider. Make sure you discuss any questions you have with your health care provider.  Document Released: 2006 Document Revised: 2018 Document Reviewed: 2017  [employer] Patient Education ©  [employer]            Complementary and Alternative Medical Therapies for Diabetes  Complementary and alternative medical therapies are treatments that are different from typical medical treatments (Western treatments). \"Complementary\" means that the therapy is used with Western treatments. \"Alternative\" means that the therapy is used instead of Western treatments.  Are these therapies safe?  Some of these therapies are usually safe. Others may be harmful. Often, there is not " enough research to show how safe or effective a therapy is. If you want to try a complementary or alternative therapy, talk with your health care provider to make sure it is safe.  What alternative or complementary therapies are used to treat diabetes?  Acupuncture    Acupuncture is the insertion of needles into certain places on the skin. This is done by a professional. It is often used to relieve long-term (chronic) pain, especially of the bones and joints. It may help you if you have painful nerve damage.  Biofeedback  Biofeedback helps you to become more aware of your body's response to pain. It also helps you to learn ways of dealing with pain. Biofeedback is about relaxing and reducing stress. An example of a biofeedback technique is guided imagery. This involves creating peaceful images in your mind.  Chromium  Chromium is a substance that can help improve how insulin works in the body. Chromium is in many foods, including whole grains, nuts, and egg yolks. Chromium may also be taken as a supplement. Taking chromium supplements may help to control diabetes, especially if you have a lack of chromium (deficiency) in your body. However, research has not proven this. If you have kidney problems, you should be careful with chromium supplements.  American ginseng  American ginseng is an herb that may lower glucose levels. It may also help lower A1C levels. More research is needed before recommendations for ginseng use can be made.  Magnesium  Magnesium is a mineral found in many foods, such as whole grains, nuts, and green leafy vegetables. Having low magnesium levels may make controlling blood glucose more difficult for people who have type 2 diabetes. Low magnesium levels may also contribute to certain diabetes complications. Getting more magnesium and eating a high-fiber diet may reduce the risk of developing type 2 diabetes.  Vanadium  Vanadium is a compound found in small amounts in certain plants and animals.  Some studies show that it improves glucose levels in animals with diabetes. In one study, people with diabetes were able to lower their insulin dosage when taking vanadium. More research about side effects and safe dosage levels is needed.  Cinnamon  Cinnamon may decrease insulin resistance, increase insulin production, and lower blood glucose levels. It may work best when used with diabetes medicines.  Fenugreek  Fenugreek is an herb whose seeds are often used in cooking. It may help lower blood glucose by decreasing carbohydrate absorption and increasing insulin production.  Summary  Talk with your health care provider about complementary or alternative therapy for you. Some therapies may be appropriate for you, but others may cause side effects.  Follow your diabetes care plan as prescribed.  This information is not intended to replace advice given to you by your health care provider. Make sure you discuss any questions you have with your health care provider.  Document Released: 10/14/2008 Document Revised: 12/21/2018 Document Reviewed: 01/03/2018  [employer] Patient Education © 2020 [employer]            Blood Glucose Monitoring, Adult  Monitoring your blood sugar (glucose) is an important part of managing your diabetes (diabetes mellitus). Blood glucose monitoring involves checking your blood glucose as often as directed and keeping a record (log) of your results over time.  Checking your blood glucose regularly and keeping a blood glucose log can:  Help you and your health care provider adjust your diabetes management plan as needed, including your medicines or insulin.  Help you understand how food, exercise, illnesses, and medicines affect your blood glucose.  Let you know what your blood glucose is at any time. You can quickly find out if you have low blood glucose (hypoglycemia) or high blood glucose (hyperglycemia).  Your health care provider will set individualized treatment goals for you. Your goals  will be based on your age, other medical conditions you have, and how you respond to diabetes treatment. Generally, the goal of treatment is to maintain the following blood glucose levels:  Before meals (preprandial):  mg/dL (4.4-7.2 mmol/L).  After meals (postprandial): below 180 mg/dL (10 mmol/L).  A1c level: less than 7%.  Supplies needed:  Blood glucose meter.  Test strips for your meter. Each meter has its own strips. You must use the strips that came with your meter.  A needle to prick your finger (lancet). Do not use a lancet more than one time.  A device that holds the lancet (lancing device).  A journal or log book to write down your results.  How to check your blood glucose    Wash your hands with soap and water.  Prick the side of your finger (not the tip) with the lancet. Use a different finger each time.  Gently rub the finger until a small drop of blood appears.  Follow instructions that come with your meter for inserting the test strip, applying blood to the strip, and using your blood glucose meter.  Write down your result and any notes.  Some meters allow you to use areas of your body other than your finger (alternative sites) to test your blood. The most common alternative sites are:  Forearm.  Thigh.  Palm of the hand.  If you think you may have hypoglycemia, or if you have a history of not knowing when your blood glucose is getting low (hypoglycemia unawareness), do not use alternative sites. Use your finger instead. Alternative sites may not be as accurate as the fingers, because blood flow is slower in these areas. This means that the result you get may be delayed, and it may be different from the result that you would get from your finger.  Follow these instructions at home:  Blood glucose log    Every time you check your blood glucose, write down your result. Also write down any notes about things that may be affecting your blood glucose, such as your diet and exercise for the day. This  "information can help you and your health care provider:  Look for patterns in your blood glucose over time.  Adjust your diabetes management plan as needed.  Check if your meter allows you to download your records to a computer. Most glucose meters store a record of glucose readings in the meter.  If you have type 1 diabetes:  Check your blood glucose 2 or more times a day.  Also check your blood glucose:  Before every insulin injection.  Before and after exercise.  Before meals.  2 hours after a meal.  Occasionally between 2:00 a.m. and 3:00 a.m., as directed.  Before potentially dangerous tasks, like driving or using heavy machinery.  At bedtime.  You may need to check your blood glucose more often, up to 6-10 times a day, if you:  Use an insulin pump.  Need multiple daily injections (MDI).  Have diabetes that is not well-controlled.  Are ill.  Have a history of severe hypoglycemia.  Have hypoglycemia unawareness.  If you have type 2 diabetes:  If you take insulin or other diabetes medicines, check your blood glucose 2 or more times a day.  If you are on intensive insulin therapy, check your blood glucose 4 or more times a day. Occasionally, you may also need to check between 2:00 a.m. and 3:00 a.m., as directed.  Also check your blood glucose:  Before and after exercise.  Before potentially dangerous tasks, like driving or using heavy machinery.  You may need to check your blood glucose more often if:  Your medicine is being adjusted.  Your diabetes is not well-controlled.  You are ill.  General tips  Always keep your supplies with you.  If you have questions or need help, all blood glucose meters have a 24-hour \"hotline\" phone number that you can call. You may also contact your health care provider.  After you use a few boxes of test strips, adjust (calibrate) your blood glucose meter by following instructions that came with your meter.  Contact a health care provider if:  Your blood glucose is at or above 240 " mg/dL (13.3 mmol/L) for 2 days in a row.  You have been sick or have had a fever for 2 days or longer, and you are not getting better.  You have any of the following problems for more than 6 hours:  You cannot eat or drink.  You have nausea or vomiting.  You have diarrhea.  Get help right away if:  Your blood glucose is lower than 54 mg/dL (3 mmol/L).  You become confused or you have trouble thinking clearly.  You have difficulty breathing.  You have moderate or large ketone levels in your urine.  Summary  Monitoring your blood sugar (glucose) is an important part of managing your diabetes (diabetes mellitus).  Blood glucose monitoring involves checking your blood glucose as often as directed and keeping a record (log) of your results over time.  Your health care provider will set individualized treatment goals for you. Your goals will be based on your age, other medical conditions you have, and how you respond to diabetes treatment.  Every time you check your blood glucose, write down your result. Also write down any notes about things that may be affecting your blood glucose, such as your diet and exercise for the day.  This information is not intended to replace advice given to you by your health care provider. Make sure you discuss any questions you have with your health care provider.  Document Released: 12/20/2004 Document Revised: 10/11/2019 Document Reviewed: 05/29/2017  Elsevier Patient Education © 2020 [employer].

## 2022-06-10 NOTE — CARE PLAN
The patient is Stable - Low risk of patient condition declining or worsening    Shift Goals  Clinical Goals: Patient will be able to demonstrate glucose checks and insulin administration by end of shift  Patient Goals: Patient will receive adequate education on new diabetes diagnosis  Family Goals: Patient will receive adequate education on new diabetes diagnosis    Progress made toward(s) clinical / shift goals:  Patient was able to demonstrate proper accucheck and insulin administration skills. Patient and mother were satisfied with bedside diabetes education. Both understand importance of following up with his primary care doctor as well as establishing care with an endocrinologist.    Patient is not progressing towards the following goals:      Problem: Knowledge Deficit - Standard  Goal: Patient and family/care givers will demonstrate understanding of plan of care, disease process/condition, diagnostic tests and medications  Outcome: Met     Problem: Diabetes Management  Goal: Patient will achieve and maintain glucose in satisfactory range  Outcome: Met     Problem: Knowledge Deficit - Diabetes  Goal: Patient will demonstrate knowledge of insulin injection, symptoms, and treatment of hypoglycemia and diet prior to discharge  Outcome: Met

## 2022-06-10 NOTE — DISCHARGE SUMMARY
"Discharge Summary    CHIEF COMPLAINT ON ADMISSION  Chief Complaint   Patient presents with   • Sent by MD     Pt walk-in with mother. Per pt, went to PCP yesterday where he got \"finger pricks\" and a UA and the MD advised pt to come to ED for diabetic workup. Pt denies any complaints.       Reason for Admission  Sent from MD, Blood sugar probem      Admission Date  6/8/2022    CODE STATUS  Full Code    HPI & HOSPITAL COURSE    21-year-old male sent by PCP for hyperglycemia.  Patient was found to have glucose of 274 and started on insulin.  His A1c is 17.1.  He has been started on insulin 18 units and has been counseled on keeping a log and following up with PCP within a week for further optimization of his blood sugars.      Therefore, he is discharged in good and stable condition to home with close outpatient follow-up.      Discharge Date  6/10/22    FOLLOW UP ITEMS POST DISCHARGE  F/u with PCP with blood sugar log    DISCHARGE DIAGNOSES  Principal Problem:    Diabetes mellitus, new onset (HCC) POA: Yes  Resolved Problems:    * No resolved hospital problems. *      FOLLOW UP  No future appointments.  No follow-up provider specified.    MEDICATIONS ON DISCHARGE     Medication List      START taking these medications      Instructions   Alcohol Swabs   Doctor's comments: Per formulary preference. ICD-10 code: E11.65 Uncontrolled type 2 Diabetes Mellitus  Wipe site with prep pad prior to injection.     * Blood Glucose Test Strips   Doctor's comments: Or per formulary preference. ICD-10 code: E11.65 Uncontrolled type 2 Diabetes Mellitus  Use one One Touch Verio Flex strip to test blood sugar three times daily before meals.     * Blood Glucose Monitor System w/Device Kit   Doctor's comments: Or per formulary preference. ICD-10 code: E11.65 Uncontrolled type 2 Diabetes Mellitus  Test blood sugar as recommended by provider. One Touch Verio Flex blood glucose monitoring kit.     insulin glargine 100 UNIT/ML Sopn " injection  Generic drug: insulin glargine   Inject 18 Units under the skin every evening.  Dose: 18 Units     Insulin Pen Needle 32 G x 4 mm   Doctor's comments: Per patient/formulary preference. ICD-10 code: E11.65 Uncontrolled type 2 Diabetes Mellitus  Use one pen needle in pen device to inject insulin once daily.     Lancets   Doctor's comments: Or per formulary preference. ICD-10 code: E11.65 Uncontrolled type 2 Diabetes Mellitus  Use one One Touch Verio Flex lancet to test blood sugar three times daily before meals.         * This list has 2 medication(s) that are the same as other medications prescribed for you. Read the directions carefully, and ask your doctor or other care provider to review them with you.                Allergies  No Known Allergies    DIET  Orders Placed This Encounter   Procedures   • Diet Order Diet: Consistent CHO (Diabetic)     Standing Status:   Standing     Number of Occurrences:   1     Order Specific Question:   Diet:     Answer:   Consistent CHO (Diabetic) [4]       ACTIVITY  As tolerated.  Weight bearing as tolerated    CONSULTATIONS  none    PROCEDURES  none    LABORATORY  Lab Results   Component Value Date    SODIUM 139 06/09/2022    POTASSIUM 3.3 (L) 06/09/2022    CHLORIDE 101 06/09/2022    CO2 24 06/09/2022    GLUCOSE 184 (H) 06/09/2022    BUN 13 06/09/2022    CREATININE 0.85 06/09/2022        Lab Results   Component Value Date    WBC 5.2 06/09/2022    HEMOGLOBIN 14.4 06/09/2022    HEMATOCRIT 41.6 (L) 06/09/2022    PLATELETCT 178 06/09/2022        Total time of the discharge process exceeds 28 minutes.

## 2022-06-10 NOTE — DISCHARGE PLANNING
Case Management Discharge Planning    Admission Date: 6/8/2022  GMLOS:    ALOS: 0    6-Clicks ADL Score: 24  6-Clicks Mobility Score: 24      Anticipated Discharge Dispo: Discharge Disposition: Discharged to home/self care (01)    DME Needed: No    Action(s) Taken: Updated Provider/Nurse on Discharge Plan and OTHER     $35 Sarah with [facility] states cost of 1 pen would be $35.  Sarah states that is all they have at this time. MD notified.    1010: RN CM reach out to [facility] again to verify insulin glargine was the cheapest option which it in fact is per Tiff.  Insulin glargine is $35/16 days. Glucose monitor is  $17.78.  MD notified of pricing.     Escalations Completed: Provider    Medically Clear: No    Next Steps:Medical clearance    Barriers to Discharge: Medical clearance    Is the patient up for discharge tomorrow: No

## 2022-06-10 NOTE — CARE PLAN
The patient is Stable - Low risk of patient condition declining or worsening    Shift Goals  Clinical Goals: blood sugar within normal limits  Patient Goals: more patient education on diabetes.  Family Goals: more patient education on diabetes    Progress made toward(s) clinical / shift goals:  Blood sugar monitored every 6 hours.  Education given.      Patient is not progressing towards the following goals:  N/A.

## 2022-06-10 NOTE — PROGRESS NOTES
Patient is being discharged home in self care. Patient is A&Ox4. IV removed. Discharge instructions provided to patient and reviewed follow up appointments and diabetic education with him and his mother. Patient and mom verbalized understanding and all questions and concerns were addressed before leaving. All belongings were packed and taken with.

## 2022-06-11 LAB — B-OH-BUTYR SERPL-MCNC: 4.87 MMOL/L (ref 0.02–0.27)

## 2022-06-21 ENCOUNTER — HOSPITAL ENCOUNTER (OUTPATIENT)
Dept: LAB | Facility: MEDICAL CENTER | Age: 22
End: 2022-06-21
Payer: COMMERCIAL

## 2022-06-21 ENCOUNTER — OFFICE VISIT (OUTPATIENT)
Dept: MEDICAL GROUP | Facility: PHYSICIAN GROUP | Age: 22
End: 2022-06-21
Payer: COMMERCIAL

## 2022-06-21 VITALS
WEIGHT: 163.8 LBS | DIASTOLIC BLOOD PRESSURE: 56 MMHG | SYSTOLIC BLOOD PRESSURE: 90 MMHG | RESPIRATION RATE: 16 BRPM | HEIGHT: 73 IN | BODY MASS INDEX: 21.71 KG/M2 | OXYGEN SATURATION: 98 % | HEART RATE: 78 BPM | TEMPERATURE: 97.4 F

## 2022-06-21 DIAGNOSIS — E11.9 DIABETES MELLITUS, NEW ONSET (HCC): ICD-10-CM

## 2022-06-21 LAB
CHOLEST SERPL-MCNC: 206 MG/DL (ref 100–199)
CREAT UR-MCNC: 254.99 MG/DL
FASTING STATUS PATIENT QL REPORTED: NORMAL
HDLC SERPL-MCNC: 57 MG/DL
LDLC SERPL CALC-MCNC: 137 MG/DL
MICROALBUMIN UR-MCNC: <1.2 MG/DL
MICROALBUMIN/CREAT UR: NORMAL MG/G (ref 0–30)
TRIGL SERPL-MCNC: 61 MG/DL (ref 0–149)

## 2022-06-21 PROCEDURE — 36415 COLL VENOUS BLD VENIPUNCTURE: CPT

## 2022-06-21 PROCEDURE — 80061 LIPID PANEL: CPT

## 2022-06-21 PROCEDURE — 82043 UR ALBUMIN QUANTITATIVE: CPT

## 2022-06-21 PROCEDURE — 99214 OFFICE O/P EST MOD 30 MIN: CPT

## 2022-06-21 PROCEDURE — 82570 ASSAY OF URINE CREATININE: CPT

## 2022-06-21 RX ORDER — BLOOD SUGAR DIAGNOSTIC
STRIP MISCELLANEOUS
COMMUNITY
Start: 2022-06-10 | End: 2022-12-14 | Stop reason: SDUPTHER

## 2022-06-21 RX ORDER — INSULIN ASPART INJECTION 100 [IU]/ML
4 INJECTION, SOLUTION SUBCUTANEOUS
Qty: 3 ML | Refills: 3 | Status: SHIPPED | OUTPATIENT
Start: 2022-06-21 | End: 2022-06-23 | Stop reason: CLARIF

## 2022-06-21 RX ORDER — INSULIN ASPART INJECTION 100 [IU]/ML
4 INJECTION, SOLUTION SUBCUTANEOUS
Refills: 3 | OUTPATIENT
Start: 2022-06-21

## 2022-06-21 ASSESSMENT — FIBROSIS 4 INDEX: FIB4 SCORE: 0.4

## 2022-06-21 NOTE — PROGRESS NOTES
"Subjective:     CC: Follow-up type 1 diabetes    HPI:   Azam presents today with    Problem   Diabetes Mellitus, New Onset (Hcc)    This is a new condition that was diagnosed last week.  He spent 2 days admitted at [facility] for mild DKA.  He is doing much better. He reports his polyuria has improved. Mom is with him during the visit today.  Set up to establish with endocrinology tomorrow at 8:40 AM.  He has been using 18 units of long-acting insulin every evening.  He is checking his blood sugars 4 times a day which have been ranging from 250-350.  He was not prescribed any rapid-intermediate acting insulin when he was in the hospital, nor did he receive formal education about this disease process.  He was taught to us his glucometer.          Health Maintenance: We will cover health maintenance topics once patient more stable    ROS:  Review of Systems   All other systems reviewed and are negative.      Objective:     Exam:  BP (!) 90/56 (BP Location: Left arm, Patient Position: Sitting, BP Cuff Size: Adult)   Pulse 78   Temp 36.3 °C (97.4 °F) (Temporal)   Resp 16   Ht 1.842 m (6' 0.5\")   Wt 74.3 kg (163 lb 12.8 oz)   SpO2 98%   BMI 21.91 kg/m²  Body mass index is 21.91 kg/m².    Physical Exam  Constitutional:       Appearance: Normal appearance.   HENT:      Head: Normocephalic.   Eyes:      Conjunctiva/sclera: Conjunctivae normal.      Pupils: Pupils are equal, round, and reactive to light.   Pulmonary:      Effort: Pulmonary effort is normal.   Musculoskeletal:         General: Normal range of motion.   Skin:     General: Skin is warm and dry.   Neurological:      General: No focal deficit present.      Mental Status: He is alert and oriented to person, place, and time.   Psychiatric:         Mood and Affect: Mood normal.         Behavior: Behavior normal.         Labs: 6/9/2022 shows CBC with slightly low RBCs at 4.4 and hematocrit 41.6, CMP shows potassium low at 3.3, glucose elevated 184 alk " phos slightly elevated 109    Assessment & Plan: Medical Decision Making     21 y.o. male with the following -     Problem List Items Addressed This Visit     Diabetes mellitus, new onset (HCC)     This this is a new condition currently active  - Starting patient on Fiasp 100 units/mL injectable insulin  - 1 unit of Fiasp per every 10 g of carbohydrates eaten per meal  - 1 unit of Fiasp for every 40 g of blood sugar above goal of 100  - See diabetic NP tomorrow 6-20-22 at 8:40 AM [facility]  -ED precautions given and known for worsening or deterioration of this condition           Relevant Medications    Insulin Aspart, w/Niacinamide, (FIASP PENFILL) 100 UNIT/ML Solution Cartridge    Other Relevant Orders    Microalbumin Creat Ratio Urine (Lab Collect)    Lipid Profile          Differential diagnosis, natural history, supportive care, and indications for immediate follow-up discussed.  Shared decision making approach utilized, and patient is amendable with plan of care.  Patient understands to return to clinic or go to the emergency department if symptoms worsen. All questions and concerns addressed.    Return in about 4 weeks (around 7/19/2022).    Please note that this dictation was created using voice recognition software. I have made every reasonable attempt to correct obvious errors, but I expect that there are errors of grammar and possibly content that I did not discover before finalizing the note.

## 2022-06-21 NOTE — ASSESSMENT & PLAN NOTE
This this is a new condition currently active  - Starting patient on Fiasp 100 units/mL injectable insulin  - 1 unit of Fiasp per every 10 g of carbohydrates eaten per meal  - 1 unit of Fiasp for every 40 g of blood sugar above goal of 100  - See diabetic NP tomorrow 6-20-22 at 8:40 AM [facility]  -ED precautions given and known for worsening or deterioration of this condition

## 2022-06-22 ENCOUNTER — OFFICE VISIT (OUTPATIENT)
Dept: ENDOCRINOLOGY | Facility: MEDICAL CENTER | Age: 22
End: 2022-06-22
Payer: COMMERCIAL

## 2022-06-22 VITALS
HEIGHT: 72 IN | OXYGEN SATURATION: 98 % | DIASTOLIC BLOOD PRESSURE: 70 MMHG | SYSTOLIC BLOOD PRESSURE: 126 MMHG | WEIGHT: 162.9 LBS | BODY MASS INDEX: 22.06 KG/M2 | HEART RATE: 110 BPM

## 2022-06-22 DIAGNOSIS — E10.9 TYPE 1 DIABETES MELLITUS WITHOUT COMPLICATION (HCC): ICD-10-CM

## 2022-06-22 DIAGNOSIS — E11.9 NEWLY DIAGNOSED DIABETES (HCC): ICD-10-CM

## 2022-06-22 DIAGNOSIS — E78.49 OTHER HYPERLIPIDEMIA: ICD-10-CM

## 2022-06-22 PROCEDURE — 99212 OFFICE O/P EST SF 10 MIN: CPT

## 2022-06-22 PROCEDURE — 99204 OFFICE O/P NEW MOD 45 MIN: CPT

## 2022-06-22 RX ORDER — PROCHLORPERAZINE 25 MG/1
1 SUPPOSITORY RECTAL
Qty: 2 EACH | Refills: 2 | Status: CANCELLED | OUTPATIENT
Start: 2022-06-22

## 2022-06-22 RX ORDER — PROCHLORPERAZINE 25 MG/1
1 SUPPOSITORY RECTAL
Qty: 9 EACH | Refills: 3 | Status: CANCELLED | OUTPATIENT
Start: 2022-06-22

## 2022-06-22 ASSESSMENT — FIBROSIS 4 INDEX: FIB4 SCORE: 0.4

## 2022-06-22 NOTE — PROGRESS NOTES
RN-CDE Note    Subjective:     HPI:  Azam Myles is a 21 y.o. old patient who is seen by the Diabetes Nurse Specialist today to establish care for his newly diagnosed type 1 diabetes.    Changes in Health:  Notes about a 35 lb weight loss over several months and was complaining of polydipsia prior to diagnosis.     Diabetes Medications:   Basaglar 18 units  Taking above medications as prescribed: yes  Taking daily ASA: Not Indicated    Exercise: active at work, sometimes goes to gym.   Diet: trying to eat healthier since diagnosis, limiting carbohydrates.   Patient's body mass index is 22.09 kg/m². Exercise and nutrition counseling were performed at this visit.      Health Maintenance:   Health Maintenance Due   Topic Date Due   • DIABETES MONOFILAMENT / LE EXAM  Never done   • IMM PNEUMOCOCCAL VACCINE: 0-64 Years (1 - PPSV23 or PCV20) 02/10/2005   • IMM MENINGOCOCCAL B VACCINE HEALTHY PATIENTS AGED 16 TO 23 (1 of 2 - MenB Trumenba 2-Dose Series) Never done   • RETINAL SCREENING  Never done   • COVID-19 Vaccine (3 - Booster for Moderna series) 10/05/2021         DM:   Last A1c:   Lab Results   Component Value Date/Time    HBA1C 17.1 (H) 06/08/2022 01:24 PM      A1C GOAL: < 8 by his next test, eventually goal is less than 7%    Glucose monitoring frequency: testing 4 times per day, states most blood sugars in the 250-350 range.     Hypoglycemic episodes: no    Last Retinal Exam: NEW DIAGNOSIS OF TYPE 1, NOT DUE FOR 5 YEARS      Lab Results   Component Value Date/Time    MALBCRT see below 06/21/2022 09:10 AM    MICROALBUR <1.2 06/21/2022 09:10 AM        ACR Albumin/Creatinine Ratio goal <30     HTN:   Blood pressure goal <140/<80    Currently Rx ACE/ARB: Not Indicated     Dyslipidemia:    Lab Results   Component Value Date/Time    CHOLSTRLTOT 206 (H) 06/21/2022 09:10 AM     (H) 06/21/2022 09:10 AM    HDL 57 06/21/2022 09:10 AM    TRIGLYCERIDE 61 06/21/2022 09:10 AM         Currently Rx Statin: No     He   reports that he has never smoked. He has never used smokeless tobacco.      Plan:     Discussed and educated on:   - All medications, side effects and compliance (discussed carefully)  - Annual eye examinations at Ophthal[facility]  - HbA1C: target  - Home glucose monitoring emphasized  - Weight control and daily exercise    Recommended medication changes: start Lyumjev with carbohydrate ratio of 1:15 at meals and correction factor of 1:50 over 200.  Will follow up with me tomorrow at [facility] for further education       Medication Sample: Lyumjev pen    Strength: U-100  Quantity: 2  Lot Number: [id number]   Date: 23

## 2022-06-22 NOTE — PATIENT INSTRUCTIONS
Sign up for MYCHART  Continue with Glargine at 18 units per day.   Lyumjev 1 unit for every 15 grams of carbohydrate at meals or snacks greater than 15 gms.  Correction for elevated blood sugars is 1 unit for every 50 points above 200  200-250 =1 unit  251-300 = 2 units  301-350 = 3 units  351-400 = 4 units

## 2022-06-22 NOTE — PROGRESS NOTES
Chief Complaint:  Consult requested by Brent Michelle D.N.P. for initial evaluation of the following conditions  HPI:   Azam Myles is a 21 y.o. male with Type 2 Diabetes Mellitus diagnosed in 2022.      1.  Type 1 diabetes mellitus without complication:    Glycohemoglobin A1c on 6/8/2022 was 17.1%    He monitors blood glucose  3 times per day.   Blood glucose values range:  200s fasting    Lunch 200-300s  [place] 300s    Medications:  Basaglar 18 units nightly      He denies hypoglycemic episodes   He  denies hypoglycemic unawareness.   He denies episodes of severe hypoglycemia requiring third party assistance.    He  is not wearing a medical alert bracelet or necklace.    He does not a glucagon emergency kit.    He denies attending diabetes education classes. Classes tomorrow at 1430 with rolanda  Diet: Healthy in general.     Diabetes Complications   He  denies history of retinopathy.    He denies laser eye surgery.   Last eye exam: unknown. -I will place referral  He denies history of peripheral sensory neuropathy.    He denies numbness, tingling in both feet.    He denies history of foot sores.   He denies history of kidney damage.    He is not on ACE inhibitor or ARB.   He denies history of coronary artery disease.    He  denies history of stroke and denies TIA.    He denies history of PAD.    No microalbuminuria   Latest Reference Range & Units 06/21/22 09:10   Micro Alb Creat Ratio 0 - 30 mg/g see below   Creatinine, Urine mg/dL 254.99   Microalbumin, Urine Random mg/dL <1.2       2.  Other hyperlipidemia:  Attempting to eat healthier  Increasing his exercise activity   Latest Reference Range & Units 06/21/22 09:10   Cholesterol,Tot 100 - 199 mg/dL 206 (H)   Triglycerides 0 - 149 mg/dL 61   HDL >=40 mg/dL 57   LDL <100 mg/dL 137 (H)     3.  Newly diagnosed diabetes:  Diagnosed this year with type 1 diabetes  ROS:     CONS:     No fever, no chills, no weight loss, no fatigue   EYES:      No diplopia, no blurry  vision, no redness of eyes, no swelling of eyelids   ENT:    No hearing loss, No ear pain, No sore throat, no dysphagia, no neck swelling   CV:     No chest pain, no palpitations, no claudication, no orthopnea, no PND   PULM:    No SOB, no cough, no hemoptysis, no wheezing    GI:   No nausea, no vomiting, no diarrhea, no constipation, no bloody stools   :  Passing urine well, no dysuria, no hematuria   ENDO:   No polyuria, no polydipsia, no heat intolerance, no cold intolerance   NEURO: No headaches, no dizziness, no convulsions, no tremors   MUSC:  No joint swellings, no arthralgias, no myalgias, no weakness   SKIN:   No rash, no ulcers, no dry skin   PSYCH:   No depression, no anxiety, no difficulty sleeping       Past Medical History:  Patient Active Problem List    Diagnosis Date Noted   • Diabetes mellitus, new onset (HCC) 06/08/2022   • Urinary frequency 06/07/2022   • Elevated glucose 06/07/2022   • Ketonuria 06/07/2022   • Syncope and collapse 03/06/2022   • Scoliosis 12/03/2015       Past Surgical History:  No past surgical history on file.     Allergies:  Patient has no known allergies.     Current Medications:    Current Outpatient Medications:   •  ONETOUCH VERIO strip, USE ONE ONE TOUCH VERIO FLEX STRIP TO TEST BLOOD SUGAR THREE TIMES DAILY BEFORE MEALS., Disp: , Rfl:   •  Insulin Aspart, w/Niacinamide, (FIASP PENFILL) 100 UNIT/ML Solution Cartridge, Inject 4 Units under the skin 3 times a day before meals., Disp: 3 mL, Rfl: 3  •  Blood Glucose Test Strips, Use one One Touch Verio Flex strip to test blood sugar three times daily before meals., Disp: 100 Strip, Rfl: 0  •  Lancets, Use one One Touch Verio Flex lancet to test blood sugar three times daily before meals., Disp: 100 Each, Rfl: 0  •  Alcohol Swabs, Wipe site with prep pad prior to injection., Disp: 100 Each, Rfl: 0  •  Insulin Pen Needle 32 G x 4 mm, Use one pen needle in pen device to inject insulin once daily., Disp: 100 Each, Rfl: 0  •   Blood Glucose Monitoring Suppl (BLOOD GLUCOSE MONITOR SYSTEM) w/Device Kit, Test blood sugar as recommended by provider. One Touch Verio Flex blood glucose monitoring kit., Disp: 1 Kit, Rfl: 0  •  insulin glargine (INSULIN GLARGINE) 100 UNIT/ML Solution Pen-injector injection, Inject 18 Units under the skin every evening., Disp: 9 mL, Rfl: 0    Social History:  Social History     Socioeconomic History   • Marital status: Single     Spouse name: Not on file   • Number of children: Not on file   • Years of education: Not on file   • Highest education level: Not on file   Occupational History   • Not on file   Tobacco Use   • Smoking status: Never Smoker   • Smokeless tobacco: Never Used   Vaping Use   • Vaping Use: Never used   Substance and Sexual Activity   • Alcohol use: Never   • Drug use: Never   • Sexual activity: Yes     Partners: Female     Birth control/protection: Condom   Other Topics Concern   • Not on file   Social History Narrative   • Not on file     Social Determinants of Health     Financial Resource Strain: Not on file   Food Insecurity: Not on file   Transportation Needs: Not on file   Physical Activity: Not on file   Stress: Not on file   Social Connections: Not on file   Intimate Partner Violence: Not on file   Housing Stability: Not on file        Family History:   Family History   Problem Relation Age of Onset   • No Known Problems Mother    • No Known Problems Father    • No Known Problems Sister    • Diabetes Maternal Grandmother        PHYSICAL EXAM:   Vital signs: /70 (BP Location: Left arm, Patient Position: Sitting, BP Cuff Size: Adult)   Pulse (!) 110   Ht 1.829 m (6')   Wt 73.9 kg (162 lb 14.4 oz)   SpO2 98%   BMI 22.09 kg/m²   GENERAL: Well-developed, well-nourished  in no apparent distress.   EYE: No ocular and eyelid asymmetry, Anicteric sclerae,  PERRL, No exophthalmos or lidlag  HENT: Hearing grossly intact, Normocephalic, atraumatic.   NECK: Supple. Trachea midline.    CARDIOVASCULAR: Regular rate and rhythm. No murmurs, rubs, or gallops.   LUNGS: Clear to auscultation bilaterally   EXTREMITIES: No clubbing, cyanosis, or edema.   NEUROLOGICAL: Cranial nerves II-XII are grossly intact   Symmetric reflexes at the patella no proximal muscle weakness, No visible tremor with both outstretched hands  LYMPH: No cervical, supraclavicular,  adenopathy palpated.   SKIN: No rashes, lesions. Turgor is normal.  FOOT: Normal sensation to monofilament testing, normal pulses, no ulcers.  Normal Vibration quantitative sensation test.    Labs:  Lab Results   Component Value Date/Time    HBA1C 17.1 (H) 06/08/2022 1324    AVGLUC 444 06/08/2022 1324       Lab Results   Component Value Date/Time    WBC 5.2 06/09/2022 02:22 AM    RBC 4.40 (L) 06/09/2022 02:22 AM    HEMOGLOBIN 14.4 06/09/2022 02:22 AM    MCV 94.5 06/09/2022 02:22 AM    MCH 32.7 06/09/2022 02:22 AM    MCHC 34.6 06/09/2022 02:22 AM    RDW 41.0 06/09/2022 02:22 AM    MPV 10.0 06/09/2022 02:22 AM       Lab Results   Component Value Date/Time    SODIUM 139 06/09/2022 02:22 AM    POTASSIUM 3.3 (L) 06/09/2022 02:22 AM    CHLORIDE 101 06/09/2022 02:22 AM    CO2 24 06/09/2022 02:22 AM    ANION 14.0 06/09/2022 02:22 AM    GLUCOSE 184 (H) 06/09/2022 02:22 AM    BUN 13 06/09/2022 02:22 AM    CREATININE 0.85 06/09/2022 02:22 AM    CALCIUM 9.1 06/09/2022 02:22 AM    ASTSGOT 18 06/09/2022 02:22 AM    ALTSGPT 23 06/09/2022 02:22 AM    TBILIRUBIN 0.4 06/09/2022 02:22 AM    ALBUMIN 3.8 06/09/2022 02:22 AM    TOTPROTEIN 6.2 06/09/2022 02:22 AM    GLOBULIN 2.4 06/09/2022 02:22 AM    AGRATIO 1.6 06/09/2022 02:22 AM       Lab Results   Component Value Date/Time    CHOLSTRLTOT 206 (H) 06/21/2022 0910    TRIGLYCERIDE 61 06/21/2022 0910    HDL 57 06/21/2022 0910     (H) 06/21/2022 0910       Lab Results   Component Value Date/Time    MALBCRT see below 06/21/2022 09:10 AM    MICROALBUR <1.2 06/21/2022 09:10 AM        ASSESSMENT/PLAN:   1. Type 1 diabetes  mellitus without complication (HCC)  Uncontrolled with an A1c of 17.1% on 6/8/2022  He was discharged from the hospital a couple of weeks ago with only a long-acting medication    Medication regimen as follows  Basaglar 18 units at night  Lyumjev 1 unit for every 15 g of carbohydrate with a correction factor of 1: 50 above 200    - GEETHA-65; Future  - C-PEPTIDE; Future  - IA-2 AUTOANTIBODIES  - TSH; Future  - FREE THYROXINE; Future    2. Other hyperlipidemia  Unstable  Encouraged to continue exercising  Encouraged to continue healthy diet  Encouraged to stay well-hydrated    3. Newly diagnosed diabetes (HCC)  Extensive education given about diabetes, complications, how to prevent complications  I will refer him to optometry on his next appointment for him to schedule an appointment  Diabetes education with Maroin tomorrow  Patient is interesting on an insulin pump, at this time we will start with CGM- Dexcom parachutte   As patient feels ready we can discuss further on insulin pump prescription    Disposition: Make an appointment to follow-up in 6 weeks  Do your blood work before next appointment        Thank you kindly for allowing me to participate in the diabetes care plan for this patient.    Lavell Wall A.P.R.NArianne  06/22/22    CC:   Brent Michelle D.N.P.

## 2022-06-22 NOTE — TELEPHONE ENCOUNTER
The INS cost for the Pt for this medication is too expensive, they are asking for something else?  Please advise.

## 2022-06-23 ENCOUNTER — NON-PROVIDER VISIT (OUTPATIENT)
Dept: MEDICAL GROUP | Facility: PHYSICIAN GROUP | Age: 22
End: 2022-06-23
Payer: COMMERCIAL

## 2022-06-23 DIAGNOSIS — E11.9 DIABETES MELLITUS, NEW ONSET (HCC): ICD-10-CM

## 2022-06-23 DIAGNOSIS — E10.65 UNCONTROLLED TYPE 1 DIABETES MELLITUS WITH HYPERGLYCEMIA (HCC): ICD-10-CM

## 2022-06-23 RX ORDER — INSULIN LISPRO 100 [IU]/ML
15 INJECTION, SOLUTION INTRAVENOUS; SUBCUTANEOUS
Qty: 15 ML | Refills: 6 | Status: SHIPPED | OUTPATIENT
Start: 2022-06-23 | End: 2022-06-24

## 2022-06-23 RX ORDER — INSULIN LISPRO-AABC 100 [IU]/ML
2-10 INJECTION, SOLUTION SUBCUTANEOUS
Qty: 9 ML | Refills: 6 | Status: SHIPPED | OUTPATIENT
Start: 2022-06-23 | End: 2022-06-24

## 2022-06-23 RX ORDER — INSULIN GLARGINE 100 [IU]/ML
18 INJECTION, SOLUTION SUBCUTANEOUS EVERY EVENING
Qty: 9 ML | Refills: 6 | Status: SHIPPED | OUTPATIENT
Start: 2022-06-23 | End: 2022-11-30 | Stop reason: SDUPTHER

## 2022-06-23 NOTE — PROGRESS NOTES
Patient seen for continued education.  Was seen in Endocrine office yesterday.   Reviewed hypoglycemia, causes, sign/symptoms and treatment  Discussed drinking alcohol with diabetes  Exercise  Keeping hydrated  Rotation of sites for insulin injection, storage of insulin.   He just started using some short acting insulin with meals yesterday.   Discussed DKA, causes, symptoms and when to seek immediate medical attention.

## 2022-06-24 RX ORDER — INSULIN LISPRO 100 [IU]/ML
15 INJECTION, SOLUTION INTRAVENOUS; SUBCUTANEOUS
Qty: 1.5 ML | Refills: 6 | Status: SHIPPED | OUTPATIENT
Start: 2022-06-24 | End: 2022-06-28

## 2022-06-24 RX ORDER — INSULIN LISPRO-AABC 100 [IU]/ML
2-10 INJECTION, SOLUTION SUBCUTANEOUS
Qty: 1.5 ML | Refills: 6 | Status: SHIPPED | OUTPATIENT
Start: 2022-06-24 | End: 2022-06-28

## 2022-06-25 DIAGNOSIS — E10.65 UNCONTROLLED TYPE 1 DIABETES MELLITUS WITH HYPERGLYCEMIA (HCC): ICD-10-CM

## 2022-06-27 NOTE — TELEPHONE ENCOUNTER
Phone Number Called: [phone] (home)     Call outcome: Spoke to patient regarding message below.    Message: Pt informed, no questions at this time.

## 2022-06-28 RX ORDER — INSULIN ASPART 100 [IU]/ML
20 INJECTION, SOLUTION INTRAVENOUS; SUBCUTANEOUS
Qty: 54 ML | Refills: 0 | Status: SHIPPED | OUTPATIENT
Start: 2022-06-28 | End: 2022-09-26

## 2022-06-28 RX ORDER — INSULIN LISPRO 100 [IU]/ML
15 INJECTION, SOLUTION INTRAVENOUS; SUBCUTANEOUS
Qty: 3 ML | Refills: 6 | Status: SHIPPED | OUTPATIENT
Start: 2022-06-28 | End: 2023-04-28 | Stop reason: SDUPTHER

## 2022-07-12 DIAGNOSIS — E11.9 DIABETES MELLITUS, NEW ONSET (HCC): ICD-10-CM

## 2022-07-12 RX ORDER — LANCETS 30 GAUGE
EACH MISCELLANEOUS
Qty: 100 EACH | Refills: 3 | Status: SHIPPED | OUTPATIENT
Start: 2022-07-12 | End: 2022-07-16 | Stop reason: SDUPTHER

## 2022-07-16 DIAGNOSIS — E11.9 DIABETES MELLITUS, NEW ONSET (HCC): ICD-10-CM

## 2022-07-16 RX ORDER — LANCETS 30 GAUGE
EACH MISCELLANEOUS
Qty: 100 EACH | Refills: 3 | Status: SHIPPED | OUTPATIENT
Start: 2022-07-16 | End: 2023-08-02

## 2022-07-26 ENCOUNTER — OFFICE VISIT (OUTPATIENT)
Dept: MEDICAL GROUP | Facility: PHYSICIAN GROUP | Age: 22
End: 2022-07-26

## 2022-07-26 ENCOUNTER — HOSPITAL ENCOUNTER (OUTPATIENT)
Dept: LAB | Facility: MEDICAL CENTER | Age: 22
End: 2022-07-26
Payer: COMMERCIAL

## 2022-07-26 VITALS
SYSTOLIC BLOOD PRESSURE: 116 MMHG | RESPIRATION RATE: 16 BRPM | DIASTOLIC BLOOD PRESSURE: 60 MMHG | OXYGEN SATURATION: 98 % | HEART RATE: 77 BPM | WEIGHT: 177.6 LBS | TEMPERATURE: 98.2 F | BODY MASS INDEX: 24.06 KG/M2 | HEIGHT: 72 IN

## 2022-07-26 DIAGNOSIS — E11.9 DIABETES MELLITUS, NEW ONSET (HCC): ICD-10-CM

## 2022-07-26 DIAGNOSIS — E10.9 TYPE 1 DIABETES MELLITUS WITHOUT COMPLICATION (HCC): ICD-10-CM

## 2022-07-26 PROCEDURE — 84443 ASSAY THYROID STIM HORMONE: CPT

## 2022-07-26 PROCEDURE — 36415 COLL VENOUS BLD VENIPUNCTURE: CPT

## 2022-07-26 PROCEDURE — 84439 ASSAY OF FREE THYROXINE: CPT

## 2022-07-26 PROCEDURE — 84681 ASSAY OF C-PEPTIDE: CPT

## 2022-07-26 PROCEDURE — 86341 ISLET CELL ANTIBODY: CPT | Mod: 91

## 2022-07-26 PROCEDURE — 99212 OFFICE O/P EST SF 10 MIN: CPT

## 2022-07-26 ASSESSMENT — FIBROSIS 4 INDEX: FIB4 SCORE: 0.4

## 2022-07-27 LAB
T4 FREE SERPL-MCNC: 1.34 NG/DL (ref 0.93–1.7)
TSH SERPL DL<=0.005 MIU/L-ACNC: 1.43 UIU/ML (ref 0.38–5.33)

## 2022-07-27 NOTE — PROGRESS NOTES
Subjective:     CC: Follow-up for diabetes    HPI:   Azam presents today with    Problem   Diabetes Mellitus, New Onset (Hcc)    This is a new condition diagnosed last month.  He presented to clinic with frequent urination and it was discovered that his blood sugar reading was quite elevated and hemoglobin A1c showed him to have type 1 diabetes.  He had previously lost a lot of weight and did not feel good.  He reports today that he is feeling much better and has gained back 15 to 20 pounds that he had lost.  He is currently under the care of endocrinology Dr. Whipple and monica Wall.  He is checking his sugars at least 3 times a day readings are between .  He is on an insulin regimen using Humalog 3 times a day before meals.  He counts carbs and basis his dosage upon that.  He also uses Lantus in the evening before bed.  He denies any episodes of hypoglycemia.  He sees Lavell again on 8/3/2022         Health Maintenance: Completed monofilament exam performed, recommend pneumonia and meningitis vaccine at next wellness visit.  COVID booster at vaccine clinic or pharmacy of choice    ROS:  Review of Systems   All other systems reviewed and are negative.      Objective:     Exam:  /60 (BP Location: Left arm, Patient Position: Sitting, BP Cuff Size: Adult)   Pulse 77   Temp 36.8 °C (98.2 °F) (Temporal)   Resp 16   Ht 1.829 m (6')   Wt 80.6 kg (177 lb 9.6 oz)   SpO2 98%   BMI 24.09 kg/m²  Body mass index is 24.09 kg/m².    Physical Exam  Constitutional:       Appearance: Normal appearance.   HENT:      Head: Normocephalic.   Eyes:      Conjunctiva/sclera: Conjunctivae normal.      Pupils: Pupils are equal, round, and reactive to light.   Pulmonary:      Effort: Pulmonary effort is normal.   Musculoskeletal:         General: Normal range of motion.   Skin:     General: Skin is warm and dry.   Neurological:      General: No focal deficit present.      Mental Status: He is alert and oriented to person,  place, and time.   Psychiatric:         Mood and Affect: Mood normal.         Behavior: Behavior normal.         Labs: No recent    Assessment & Plan: Medical Decision Making     21 y.o. male with the following -     Problem List Items Addressed This Visit     Diabetes mellitus, new onset (HCC)     This is a new condition currently active  - Continue to take medication and follow-up with endocrinology as ordered  - Discussed importance of eye exams and retinal screenings annually will refer if needed  Foot care discussed at length.  Monofilament exam performed  Monofilament testing with a 10 gram force: sensation intact: intact bilaterally  Visual Inspection: Feet without maceration, ulcers, fissures.  Pedal pulses: intact bilaterally                Relevant Orders    Diabetic Monofilament LE Exam (Completed)          Differential diagnosis, natural history, supportive care, and indications for immediate follow-up discussed.  Shared decision making approach utilized, and patient is amendable with plan of care.  Patient understands to return to clinic or go to the emergency department if symptoms worsen. All questions and concerns addressed.    Return in about 6 months (around 1/26/2023) for Health Maintanence.    Please note that this dictation was created using voice recognition software. I have made every reasonable attempt to correct obvious errors, but I expect that there are errors of grammar and possibly content that I did not discover before finalizing the note.

## 2022-07-28 LAB — C PEPTIDE SERPL-MCNC: 0.4 NG/ML (ref 0.5–3.3)

## 2022-07-29 LAB
GAD65 AB SER IA-ACNC: <5 IU/ML (ref 0–5)
ISLET CELL512 AB SER IA-ACNC: >120 U/ML (ref 0–7.4)

## 2022-08-03 ENCOUNTER — OFFICE VISIT (OUTPATIENT)
Dept: ENDOCRINOLOGY | Facility: MEDICAL CENTER | Age: 22
End: 2022-08-03
Payer: COMMERCIAL

## 2022-08-03 VITALS
WEIGHT: 177.4 LBS | HEIGHT: 72 IN | BODY MASS INDEX: 24.03 KG/M2 | SYSTOLIC BLOOD PRESSURE: 98 MMHG | HEART RATE: 87 BPM | DIASTOLIC BLOOD PRESSURE: 60 MMHG | OXYGEN SATURATION: 96 %

## 2022-08-03 DIAGNOSIS — E78.49 OTHER HYPERLIPIDEMIA: ICD-10-CM

## 2022-08-03 DIAGNOSIS — E10.9 TYPE 1 DIABETES MELLITUS WITHOUT COMPLICATION (HCC): ICD-10-CM

## 2022-08-03 DIAGNOSIS — Z79.4 LONG TERM (CURRENT) USE OF INSULIN (HCC): ICD-10-CM

## 2022-08-03 LAB
HBA1C MFR BLD: 8.5 % (ref 0–5.6)
HBA1C MFR BLD: NORMAL % (ref ?–5.8)
INT CON NEG: NORMAL
INT CON POS: NORMAL

## 2022-08-03 PROCEDURE — 99212 OFFICE O/P EST SF 10 MIN: CPT

## 2022-08-03 PROCEDURE — 99214 OFFICE O/P EST MOD 30 MIN: CPT

## 2022-08-03 PROCEDURE — 83036 HEMOGLOBIN GLYCOSYLATED A1C: CPT

## 2022-08-03 ASSESSMENT — FIBROSIS 4 INDEX: FIB4 SCORE: 0.4

## 2022-08-03 NOTE — PROGRESS NOTES
"RN-CDE Note    Subjective:     HPI:  Azam Myles is a 21 y.o. old patient who is seen by the Diabetes Nurse Specialist today for review of his type 1 diabetes.    Changes in Health: none    Diabetes Medications:   Basaglar 1 units  Admelog 1:10 cho ratio and 1:50 over 200 correction factor  Taking above medications as prescribed: yes  Taking daily ASA: Not Indicated    Exercise: work on farm, pretty active.   Diet: \"healthy\" diet  in general  Patient's body mass index is 24.06 kg/m². Exercise and nutrition counseling were performed at this visit.      Health Maintenance:   Health Maintenance Due   Topic Date Due   • IMM PNEUMOCOCCAL VACCINE: 0-64 Years (1 - PPSV23 or PCV20) 02/10/2005   • IMM MENINGOCOCCAL B VACCINE HEALTHY PATIENTS AGED 16 TO 23 (1 of 2 - MenB Trumenba 2-Dose Series) Never done   • COVID-19 Vaccine (3 - Booster for Moderna series) 10/05/2021         DM:   Last A1c:   Lab Results   Component Value Date/Time    HBA1C 8.5 (A) 08/03/2022 11:05 AM      Previous A1c was 17.1 on 6/8/2022  A1C GOAL: < 7    Glucose monitoring frequency: testing 4+ times per day    Hypoglycemic episodes: no    Last Retinal Exam: not applicable for 5 years    Exam:  Monofilament: on file and up to date    Lab Results   Component Value Date/Time    MALBCRT see below 06/21/2022 09:10 AM    MICROALBUR <1.2 06/21/2022 09:10 AM        ACR Albumin/Creatinine Ratio goal <30     HTN:   Blood pressure goal <140/<80 at goal.   Currently Rx ACE/ARB: Not Indicated     Dyslipidemia:    Lab Results   Component Value Date/Time    CHOLSTRLTOT 206 (H) 06/21/2022 09:10 AM     (H) 06/21/2022 09:10 AM    HDL 57 06/21/2022 09:10 AM    TRIGLYCERIDE 61 06/21/2022 09:10 AM         Currently Rx Statin: No     He  reports that he has never smoked. He has never used smokeless tobacco.      Plan:     Discussed and educated on:   - All medications, side effects and compliance (discussed carefully)  - HbA1C: target  - Home glucose monitoring " Dx: Acute low back pain, unspecified back pain laterality, unspecified whether sciatica present            Insurance (Authorized # of Visits):  [id number]           Authorizing Physician: Dr. Adrianne Barnett  Next MD visit: none scheduled  Fall Risk: standard         Precau emphasized  - Weight control and daily exercise    Recommended medication changes: none

## 2022-08-03 NOTE — PROGRESS NOTES
Chief Complaint:  Consult requested by Brent Michelle D.N.P. for initial evaluation of the following conditions  HPI:   Azam Myles is a 21 y.o. male with Type 2 Diabetes Mellitus diagnosed in 2022.      1.  Type 1 diabetes mellitus without complication:      POC A1c on 8/3/2020 2 8.5%  Glycohemoglobin A1c on 6/8/2022 was 17.1%    He monitors blood glucose  3 times per day.   Blood glucose values range:  200s fasting    Lunch 200-300s  [place] 300s    Dex[employer] CGM sent on last appointment-they have been trying to contact patient but he has not answered his phone    Medications:  Basaglar 18 units nightly  Humalog 1 unit for every 10 g of Carbohyrdrate with correction factor of 1:50 above 200       He denies hypoglycemic episodes   He  denies hypoglycemic unawareness.     He denies attending diabetes education classes. Classes tomorrow at 1430 with rolanda  Diet: Healthy in general.     Diabetes Complications   He  denies history of retinopathy.    He denies laser eye surgery.   Last eye exam: unknown.      Latest Reference Range & Units 07/26/22 16:45   C-Peptide 0.5 - 3.3 ng/mL 0.4 (L)   IA-2, Autoantibody 0.0 - 7.4 U/mL >120.0 (H)      Latest Reference Range & Units 07/26/22 16:45   GEETHA Antibody 0.0 - 5.0 IU/mL <5.0      Latest Reference Range & Units 07/26/22 16:45   TSH 0.380 - 5.330 uIU/mL 1.430   Free T-4 0.93 - 1.70 ng/dL 1.34     No microalbuminuria   Latest Reference Range & Units 06/21/22 09:10   Micro Alb Creat Ratio 0 - 30 mg/g see below   Creatinine, Urine mg/dL 254.99   Microalbumin, Urine Random mg/dL <1.2       2.  Other hyperlipidemia:  Attempting to eat healthier  Increasing his exercise activity   Latest Reference Range & Units 06/21/22 09:10   Cholesterol,Tot 100 - 199 mg/dL 206 (H)   Triglycerides 0 - 149 mg/dL 61   HDL >=40 mg/dL 57   LDL <100 mg/dL 137 (H)     3.  Long term current use of insulin:  Insulin use for diabetes treatment    ROS:     CONS:     No fever, no chills, no weight loss, no  fatigue   EYES:      No diplopia, no blurry vision, no redness of eyes, no swelling of eyelids   ENT:    No hearing loss, No ear pain, No sore throat, no dysphagia, no neck swelling   CV:     No chest pain, no palpitations, no claudication, no orthopnea, no PND   PULM:    No SOB, no cough, no hemoptysis, no wheezing    GI:   No nausea, no vomiting, no diarrhea, no constipation, no bloody stools   :  Passing urine well, no dysuria, no hematuria   ENDO:   No polyuria, no polydipsia, no heat intolerance, no cold intolerance   NEURO: No headaches, no dizziness, no convulsions, no tremors   MUSC:  No joint swellings, no arthralgias, no myalgias, no weakness   SKIN:   No rash, no ulcers, no dry skin   PSYCH:   No depression, no anxiety, no difficulty sleeping       Past Medical History:  Patient Active Problem List    Diagnosis Date Noted   • Diabetes mellitus, new onset (HCC) 06/08/2022   • Urinary frequency 06/07/2022   • Elevated glucose 06/07/2022   • Ketonuria 06/07/2022   • Syncope and collapse 03/06/2022   • Scoliosis 12/03/2015       Past Surgical History:  No past surgical history on file.     Allergies:  Patient has no known allergies.     Current Medications:    Current Outpatient Medications:   •  Lancets, Use one One Touch Verio Flex lancet to test blood sugar three times daily before meals., Disp: 100 Each, Rfl: 3  •  Blood Glucose Test Strips, Use one One Touch Verio Flex strip to test blood sugar three times daily before meals., Disp: 100 Strip, Rfl: 6  •  insulin aspart (NOVOLOG FLEXPEN) 100 UNIT/ML injection PEN, Inject 20 Units under the skin 3 times a day before meals for 90 days., Disp: 54 mL, Rfl: 0  •  insulin lispro (HUMALOG,ADMELOG) 100 UNIT/ML Solution Pen-injector injection PEN, INJECT 15 UNITS UNDER THE SKIN 3 TIMES A DAY BEFORE MEALS., Disp: 3 mL, Rfl: 6  •  insulin glargine (INSULIN GLARGINE) 100 UNIT/ML Solution Pen-injector injection, Inject 18 Units under the skin every evening., Disp: 9  mL, Rfl: 6  •  Insulin Pen Needle 32 G x 4 mm, Use one pen needle in pen device to inject insulin once daily., Disp: 100 Each, Rfl: 0  •  ONETOUCH VERIO strip, USE ONE ONE TOUCH VERIO FLEX STRIP TO TEST BLOOD SUGAR THREE TIMES DAILY BEFORE MEALS., Disp: , Rfl:   •  Alcohol Swabs, Wipe site with prep pad prior to injection., Disp: 100 Each, Rfl: 0  •  Blood Glucose Monitoring Suppl (BLOOD GLUCOSE MONITOR SYSTEM) w/Device Kit, Test blood sugar as recommended by provider. One Touch Verio Flex blood glucose monitoring kit., Disp: 1 Kit, Rfl: 0    Social History:  Social History     Socioeconomic History   • Marital status: Single     Spouse name: Not on file   • Number of children: Not on file   • Years of education: Not on file   • Highest education level: Not on file   Occupational History   • Not on file   Tobacco Use   • Smoking status: Never Smoker   • Smokeless tobacco: Never Used   Vaping Use   • Vaping Use: Never used   Substance and Sexual Activity   • Alcohol use: Never   • Drug use: Never   • Sexual activity: Yes     Partners: Female     Birth control/protection: Condom   Other Topics Concern   • Not on file   Social History Narrative   • Not on file     Social Determinants of Health     Financial Resource Strain: Not on file   Food Insecurity: Not on file   Transportation Needs: Not on file   Physical Activity: Not on file   Stress: Not on file   Social Connections: Not on file   Intimate Partner Violence: Not on file   Housing Stability: Not on file        Family History:   Family History   Problem Relation Age of Onset   • No Known Problems Mother    • No Known Problems Father    • No Known Problems Sister    • Diabetes Maternal Grandmother        PHYSICAL EXAM:   Vital signs: BP (!) 98/60   Pulse 87   Ht 1.829 m (6')   Wt 80.5 kg (177 lb 6.4 oz)   SpO2 96%   BMI 24.06 kg/m²   GENERAL: Well-developed, well-nourished  in no apparent distress.   EYE: No ocular and eyelid asymmetry, Anicteric sclerae,   PERRL, No exophthalmos or lidlag  HENT: Hearing grossly intact, Normocephalic, atraumatic.   NECK: Supple. Trachea midline.   CARDIOVASCULAR: Regular rate and rhythm. No murmurs, rubs, or gallops.   LUNGS: Clear to auscultation bilaterally   EXTREMITIES: No clubbing, cyanosis, or edema.   NEUROLOGICAL: Cranial nerves II-XII are grossly intact   Symmetric reflexes at the patella no proximal muscle weakness, No visible tremor with both outstretched hands  LYMPH: No cervical, supraclavicular,  adenopathy palpated.   SKIN: No rashes, lesions. Turgor is normal.  FOOT: Normal sensation to monofilament testing, normal pulses, no ulcers.  Normal Vibration quantitative sensation test.    Labs:  Lab Results   Component Value Date/Time    HBA1C 17.1 (H) 06/08/2022 1324    AVGLUC 444 06/08/2022 1324       Lab Results   Component Value Date/Time    WBC 5.2 06/09/2022 02:22 AM    RBC 4.40 (L) 06/09/2022 02:22 AM    HEMOGLOBIN 14.4 06/09/2022 02:22 AM    MCV 94.5 06/09/2022 02:22 AM    MCH 32.7 06/09/2022 02:22 AM    MCHC 34.6 06/09/2022 02:22 AM    RDW 41.0 06/09/2022 02:22 AM    MPV 10.0 06/09/2022 02:22 AM       Lab Results   Component Value Date/Time    SODIUM 139 06/09/2022 02:22 AM    POTASSIUM 3.3 (L) 06/09/2022 02:22 AM    CHLORIDE 101 06/09/2022 02:22 AM    CO2 24 06/09/2022 02:22 AM    ANION 14.0 06/09/2022 02:22 AM    GLUCOSE 184 (H) 06/09/2022 02:22 AM    BUN 13 06/09/2022 02:22 AM    CREATININE 0.85 06/09/2022 02:22 AM    CALCIUM 9.1 06/09/2022 02:22 AM    ASTSGOT 18 06/09/2022 02:22 AM    ALTSGPT 23 06/09/2022 02:22 AM    TBILIRUBIN 0.4 06/09/2022 02:22 AM    ALBUMIN 3.8 06/09/2022 02:22 AM    TOTPROTEIN 6.2 06/09/2022 02:22 AM    GLOBULIN 2.4 06/09/2022 02:22 AM    AGRATIO 1.6 06/09/2022 02:22 AM       Lab Results   Component Value Date/Time    CHOLSTRLTOT 206 (H) 06/21/2022 0910    TRIGLYCERIDE 61 06/21/2022 0910    HDL 57 06/21/2022 0910     (H) 06/21/2022 0910       Lab Results   Component Value Date/Time     MALBCRT see below 06/21/2022 09:10 AM    MICROALBUR <1.2 06/21/2022 09:10 AM        ASSESSMENT/PLAN:   1. Type 1 diabetes mellitus without complication (HCC)  Much improved with an A1c of 8.5%    Encouraged to continue healthy diet  Encouraged to exercise at least 150 minutes/week  Encouraged to stay well-hydrated    [employer]ex[employer] phone number given to patient so he can call and find out his financially he he will be able to afford it-discussed with him a message to my chart if this is not the case  Tandem pump parachuted today    Medication regimen as follows:  Basaglar 18 units nightly-continue  Humalog 1 unit for every 10 g of Carbohyrdrate with correction factor of 1:50 above 200-continue    - POCT Hemoglobin A1C  - Continuous Blood Gluc Sensor (FREESTYLE KAMILA 2 SENSOR) Misc; 1 Each every 14 days.  Dispense: 2 Each; Refill: 6  - Referral to Optometry  - Lipid Profile; Future    2. Other hyperlipidemia  Unstable- probably related to high A1c levels  We will evaluate at  next appointment  - Lipid Profile; Future    3. Long term (current) use of insulin (HCC)  On insulin for diabetes treatment      Disposition: Make an appointment to follow-up in 3 months  Do your blood work before next appointment        Thank you kindly for allowing me to participate in the diabetes care plan for this patient.    Lavell Wall A.P.R.N.  08/03/2022    CC:   Brent Michelle D.N.P.

## 2022-11-07 ENCOUNTER — TELEMEDICINE (OUTPATIENT)
Dept: ENDOCRINOLOGY | Facility: MEDICAL CENTER | Age: 22
End: 2022-11-07
Payer: COMMERCIAL

## 2022-11-07 DIAGNOSIS — Z79.4 LONG TERM (CURRENT) USE OF INSULIN (HCC): ICD-10-CM

## 2022-11-07 DIAGNOSIS — E10.9 TYPE 1 DIABETES MELLITUS WITHOUT COMPLICATION (HCC): ICD-10-CM

## 2022-11-07 DIAGNOSIS — E78.49 OTHER HYPERLIPIDEMIA: ICD-10-CM

## 2022-11-07 DIAGNOSIS — Z13.21 ENCOUNTER FOR VITAMIN DEFICIENCY SCREENING: ICD-10-CM

## 2022-11-07 PROCEDURE — 99214 OFFICE O/P EST MOD 30 MIN: CPT | Mod: 95

## 2022-11-07 NOTE — PROGRESS NOTES
Chief Complaint: Follow-up for the following conditions  Patient was presented for a telehealth consultation via secure and encrypted videoconferencing technology. This encounter was conducted via Zoom . Verbal consent was obtained. Patient's identity was verified.   HPI:   Azam Myles is a 21 y.o. male     1.  Type 1 diabetes mellitus without complication:      POC A1c on 8/3/2022 8.5%  Glycohemoglobin A1c on 6/8/2022 was 17.1%    Dexcom CGM -using it for the past two months  Average blood glucose low 100s, fasting BG around 90s-140s    Medications:  Basaglar 18 units nightly  Novolog 1 unit for every 10 g of Carbohyrdrate with correction factor of 1:50 above 200     He denies hypoglycemic episodes   He  denies hypoglycemic unawareness.     He denies attending diabetes education classes.   Diet: Healthy in general.   Exercise:     Diabetes Complications   He  denies history of retinopathy.    He denies laser eye surgery.   Last eye exam: three months ago. [facility].       Latest Reference Range & Units 07/26/22 16:45   C-Peptide 0.5 - 3.3 ng/mL 0.4 (L)   IA-2, Autoantibody 0.0 - 7.4 U/mL >120.0 (H)      Latest Reference Range & Units 07/26/22 16:45   GEETHA Antibody 0.0 - 5.0 IU/mL <5.0      Latest Reference Range & Units 07/26/22 16:45   TSH 0.380 - 5.330 uIU/mL 1.430   Free T-4 0.93 - 1.70 ng/dL 1.34     No microalbuminuria   Latest Reference Range & Units 06/21/22 09:10   Micro Alb Creat Ratio 0 - 30 mg/g see below   Creatinine, Urine mg/dL 254.99   Microalbumin, Urine Random mg/dL <1.2       2.  Other hyperlipidemia:  Attempting to eat healthier  Increasing his exercise activity   Latest Reference Range & Units 06/21/22 09:10   Cholesterol,Tot 100 - 199 mg/dL 206 (H)   Triglycerides 0 - 149 mg/dL 61   HDL >=40 mg/dL 57   LDL <100 mg/dL 137 (H)     3.  Long term current use of insulin:  Insulin use for diabetes treatment    ROS:     CONS:     No fever, no chills, no weight loss, no fatigue   EYES:       No diplopia, no blurry vision, no redness of eyes, no swelling of eyelids   ENT:    No hearing loss, No ear pain, No sore throat, no dysphagia, no neck swelling   CV:     No chest pain, no palpitations, no claudication, no orthopnea, no PND   PULM:    No SOB, no cough, no hemoptysis, no wheezing    GI:   No nausea, no vomiting, no diarrhea, no constipation, no bloody stools   :  Passing urine well, no dysuria, no hematuria   ENDO:   No polyuria, no polydipsia, no heat intolerance, no cold intolerance   NEURO: No headaches, no dizziness, no convulsions, no tremors   MUSC:  No joint swellings, no arthralgias, no myalgias, no weakness   SKIN:   No rash, no ulcers, no dry skin   PSYCH:   No depression, no anxiety, no difficulty sleeping       Past Medical History:  Patient Active Problem List    Diagnosis Date Noted    Diabetes mellitus, new onset (HCC) 06/08/2022    Urinary frequency 06/07/2022    Elevated glucose 06/07/2022    Ketonuria 06/07/2022    Syncope and collapse 03/06/2022    Scoliosis 12/03/2015       Past Surgical History:  No past surgical history on file.     Allergies:  Patient has no known allergies.     Current Medications:    Current Outpatient Medications:     Insulin Pen Needle 32 G x 4 mm, Use one pen needle for before meal and bedtime injections., Disp: 400 Each, Rfl: 0    Continuous Blood Gluc Sensor (FREESTYLE KAMILA 2 SENSOR) [facility], 1 Each every 14 days., Disp: 2 Each, Rfl: 6    Lancets, Use one One Touch Verio Flex lancet to test blood sugar three times daily before meals., Disp: 100 Each, Rfl: 3    Blood Glucose Test Strips, Use one One Touch Verio Flex strip to test blood sugar three times daily before meals., Disp: 100 Strip, Rfl: 6    insulin lispro (HUMALOG,ADMELOG) 100 UNIT/ML Solution Pen-injector injection PEN, INJECT 15 UNITS UNDER THE SKIN 3 TIMES A DAY BEFORE MEALS., Disp: 3 mL, Rfl: 6    insulin glargine (INSULIN GLARGINE) 100 UNIT/ML Solution Pen-injector injection, Inject 18  Units under the skin every evening., Disp: 9 mL, Rfl: 6    ONETOUCH VERIO strip, USE ONE ONE TOUCH VERIO FLEX STRIP TO TEST BLOOD SUGAR THREE TIMES DAILY BEFORE MEALS., Disp: , Rfl:     Alcohol Swabs, Wipe site with prep pad prior to injection., Disp: 100 Each, Rfl: 0    Blood Glucose Monitoring Suppl (BLOOD GLUCOSE MONITOR SYSTEM) w/Device Kit, Test blood sugar as recommended by provider. One Touch Verio Flex blood glucose monitoring kit., Disp: 1 Kit, Rfl: 0    Social History:  Social History     Socioeconomic History    Marital status: Single     Spouse name: Not on file    Number of children: Not on file    Years of education: Not on file    Highest education level: Not on file   Occupational History    Not on file   Tobacco Use    Smoking status: Never    Smokeless tobacco: Never   Vaping Use    Vaping Use: Never used   Substance and Sexual Activity    Alcohol use: Never    Drug use: Never    Sexual activity: Yes     Partners: Female     Birth control/protection: Condom   Other Topics Concern    Not on file   Social History Narrative    Not on file     Social Determinants of Health     Financial Resource Strain: Not on file   Food Insecurity: Not on file   Transportation Needs: Not on file   Physical Activity: Not on file   Stress: Not on file   Social Connections: Not on file   Intimate Partner Violence: Not on file   Housing Stability: Not on file        Family History:   Family History   Problem Relation Age of Onset    No Known Problems Mother     No Known Problems Father     No Known Problems Sister     Diabetes Maternal Grandmother        PHYSICAL EXAM:   Vital signs: Virtual visit  GENERAL: Well-developed, well-nourished  in no apparent distress.     Labs:    Lab Results   Component Value Date/Time    CHOLSTRLTOT 206 (H) 06/21/2022 0910    TRIGLYCERIDE 61 06/21/2022 0910    HDL 57 06/21/2022 0910     (H) 06/21/2022 0910       Lab Results   Component Value Date/Time    MALBCRT see below 06/21/2022  09:10 AM    MICROALBUR <1.2 06/21/2022 09:10 AM        ASSESSMENT/PLAN:   1. Type 1 diabetes mellitus without complication (HCC)  Unable to do A1c today, since his visit was virtual  A1c ordered for patient to do within the next few days    Lifestyle modifications discussed  Maintain a healthy diet, with minimal carbohydrates, portion control  Stable hydrated  Exercise at least 150 minutes/week    Medication regimen as follows:  Medications:  Basaglar 18 units nightly-continue  Novolog 1 unit for every 10 g of Carbohyrdrate with correction factor of 1:50 above 200-continue    Discussed different insulin pump-Medtronic, tandem, OmniPod.  We will parachuted Tandem today to assess for insurance coverage     Referred to nutritional services for diabetes diet and carb counting education    - Referral to Nutrition Services  - HEMOGLOBIN A1C; Future  - MICROALBUMIN CREAT RATIO URINE; Future  - Comp Metabolic Panel; Future  - FREE THYROXINE; Future  - TSH; Future    2. Other hyperlipidemia  Unstable  More than likely related to high A1c levels  We will evaluate his A1c is much improved  - Lipid Profile; Future    3. Long term (current) use of insulin (HCC)  On insulin for diabetes treatment    4. Encounter for vitamin deficiency screening  I will evaluate levels  - VITAMIN D,25 HYDROXY (DEFICIENCY); Future     Disposition: Make an appointment to follow-up in 3 months  Do your blood work 1 to 2 weeks before next appointment        Thank you kindly for allowing me to participate in the diabetes care plan for this patient.    ISSAC Velazco  08/03/2022    CC:   Brent Michelle D.N.P.

## 2022-11-14 DIAGNOSIS — E10.9 TYPE 1 DIABETES MELLITUS WITHOUT COMPLICATION (HCC): ICD-10-CM

## 2022-11-14 RX ORDER — PROCHLORPERAZINE 25 MG/1
1 SUPPOSITORY RECTAL
Qty: 9 EACH | Refills: 3 | Status: SHIPPED | OUTPATIENT
Start: 2022-11-14

## 2022-11-14 RX ORDER — PROCHLORPERAZINE 25 MG/1
1 SUPPOSITORY RECTAL
Qty: 2 EACH | Refills: 2 | Status: SHIPPED | OUTPATIENT
Start: 2022-11-14

## 2022-11-30 DIAGNOSIS — E10.9 TYPE 1 DIABETES (HCC): ICD-10-CM

## 2022-12-02 RX ORDER — INSULIN GLARGINE 100 [IU]/ML
18 INJECTION, SOLUTION SUBCUTANEOUS EVERY EVENING
Qty: 9 ML | Refills: 6 | Status: SHIPPED | OUTPATIENT
Start: 2022-12-02 | End: 2023-08-02

## 2022-12-06 NOTE — HOSPITAL COURSE
21-year-old male sent by PCP for hyperglycemia.  Patient was found to have glucose of 274 and started on insulin.  His A1c is 17.1.   
My signature below certifies that the above stated patient is homebound and upon completion of the Face-To-Face encounter, has the need for intermittent skilled nursing, physical therapy and/or speech or occupational therapy services in their home for their current diagnosis as outlined in their initial plan of care. These services will continue to be monitored by myself or another physician.

## 2022-12-14 DIAGNOSIS — E10.9 TYPE 1 DIABETES MELLITUS WITHOUT COMPLICATION (HCC): ICD-10-CM

## 2022-12-14 RX ORDER — BLOOD SUGAR DIAGNOSTIC
STRIP MISCELLANEOUS
Qty: 100 STRIP | Refills: 1 | Status: SHIPPED | OUTPATIENT
Start: 2022-12-14 | End: 2023-08-02

## 2023-01-27 ENCOUNTER — APPOINTMENT (OUTPATIENT)
Dept: MEDICAL GROUP | Facility: PHYSICIAN GROUP | Age: 23
End: 2023-01-27
Payer: COMMERCIAL

## 2023-05-11 DIAGNOSIS — E10.65 UNCONTROLLED TYPE 1 DIABETES MELLITUS WITH HYPERGLYCEMIA (HCC): ICD-10-CM

## 2023-05-12 RX ORDER — INSULIN LISPRO 100 [IU]/ML
15 INJECTION, SOLUTION INTRAVENOUS; SUBCUTANEOUS
Qty: 15 ML | Refills: 6 | Status: SHIPPED | OUTPATIENT
Start: 2023-05-12 | End: 2023-05-23 | Stop reason: SDUPTHER

## 2023-05-23 DIAGNOSIS — E10.65 UNCONTROLLED TYPE 1 DIABETES MELLITUS WITH HYPERGLYCEMIA (HCC): ICD-10-CM

## 2023-05-23 RX ORDER — INSULIN LISPRO 100 [IU]/ML
15 INJECTION, SOLUTION INTRAVENOUS; SUBCUTANEOUS
Qty: 15 ML | Refills: 6 | Status: SHIPPED | OUTPATIENT
Start: 2023-05-23 | End: 2023-05-30 | Stop reason: SDUPTHER

## 2023-05-30 ENCOUNTER — TELEPHONE (OUTPATIENT)
Dept: MEDICAL GROUP | Facility: PHYSICIAN GROUP | Age: 23
End: 2023-05-30
Payer: COMMERCIAL

## 2023-05-30 DIAGNOSIS — E10.9 TYPE 1 DIABETES MELLITUS WITHOUT COMPLICATION (HCC): ICD-10-CM

## 2023-05-30 DIAGNOSIS — E10.65 UNCONTROLLED TYPE 1 DIABETES MELLITUS WITH HYPERGLYCEMIA (HCC): ICD-10-CM

## 2023-05-30 RX ORDER — INSULIN LISPRO 100 [IU]/ML
50 INJECTION, SOLUTION INTRAVENOUS; SUBCUTANEOUS DAILY
Qty: 50 ML | Refills: 1 | Status: SHIPPED | OUTPATIENT
Start: 2023-05-30 | End: 2023-08-02

## 2023-05-30 RX ORDER — INSULIN LISPRO 100 [IU]/ML
15 INJECTION, SOLUTION INTRAVENOUS; SUBCUTANEOUS
Qty: 15 ML | Refills: 6 | Status: CANCELLED | OUTPATIENT
Start: 2023-05-30

## 2023-05-30 RX ORDER — INSULIN LISPRO 100 [IU]/ML
15 INJECTION, SOLUTION INTRAVENOUS; SUBCUTANEOUS
Qty: 15 ML | Refills: 6 | Status: SHIPPED | OUTPATIENT
Start: 2023-05-30 | End: 2023-05-30 | Stop reason: DRUGHIGH

## 2023-05-31 ENCOUNTER — TELEPHONE (OUTPATIENT)
Dept: ENDOCRINOLOGY | Facility: MEDICAL CENTER | Age: 23
End: 2023-05-31
Payer: COMMERCIAL

## 2023-05-31 DIAGNOSIS — E10.9 TYPE 1 DIABETES MELLITUS WITHOUT COMPLICATION (HCC): ICD-10-CM

## 2023-05-31 RX ORDER — INSULIN ASPART 100 [IU]/ML
55 INJECTION, SOLUTION INTRAVENOUS; SUBCUTANEOUS
Qty: 50 ML | Refills: 11 | Status: SHIPPED | OUTPATIENT
Start: 2023-05-31 | End: 2023-08-29 | Stop reason: SDUPTHER

## 2023-05-31 NOTE — TELEPHONE ENCOUNTER
Patients insurance covers Novolog vials.   Do you mind increasing to 55 units qty 50 ml so the 5 vials last 90 days.  Please and thank you!

## 2023-07-25 ENCOUNTER — TELEPHONE (OUTPATIENT)
Dept: ENDOCRINOLOGY | Facility: MEDICAL CENTER | Age: 23
End: 2023-07-25
Payer: COMMERCIAL

## 2023-07-25 NOTE — TELEPHONE ENCOUNTER
Aby left a message asking about a date of service in 6/2023. Per chart notes, patient was last seen for a video visit on 11/7/2022.   Left a message with Princess to get clarification on what is needed.

## 2023-08-02 ENCOUNTER — HOSPITAL ENCOUNTER (OUTPATIENT)
Facility: MEDICAL CENTER | Age: 23
End: 2023-08-02
Payer: COMMERCIAL

## 2023-08-02 ENCOUNTER — OFFICE VISIT (OUTPATIENT)
Dept: MEDICAL GROUP | Facility: PHYSICIAN GROUP | Age: 23
End: 2023-08-02
Payer: COMMERCIAL

## 2023-08-02 VITALS
OXYGEN SATURATION: 96 % | HEART RATE: 86 BPM | DIASTOLIC BLOOD PRESSURE: 80 MMHG | SYSTOLIC BLOOD PRESSURE: 130 MMHG | WEIGHT: 177.8 LBS | BODY MASS INDEX: 24.08 KG/M2 | HEIGHT: 72 IN | TEMPERATURE: 97.7 F

## 2023-08-02 DIAGNOSIS — E10.9 TYPE 1 DIABETES MELLITUS WITHOUT COMPLICATION (HCC): ICD-10-CM

## 2023-08-02 DIAGNOSIS — E78.5 DYSLIPIDEMIA: ICD-10-CM

## 2023-08-02 DIAGNOSIS — Z23 NEED FOR VACCINATION: ICD-10-CM

## 2023-08-02 DIAGNOSIS — Z11.59 NEED FOR HEPATITIS C SCREENING TEST: ICD-10-CM

## 2023-08-02 DIAGNOSIS — E78.00 ELEVATED LDL CHOLESTEROL LEVEL: ICD-10-CM

## 2023-08-02 LAB
CREAT UR-MCNC: 89.19 MG/DL
HBA1C MFR BLD: 7.5 % (ref ?–5.8)
MICROALBUMIN UR-MCNC: <1.2 MG/DL
MICROALBUMIN/CREAT UR: NORMAL MG/G (ref 0–30)
POCT INT CON NEG: NEGATIVE
POCT INT CON POS: POSITIVE

## 2023-08-02 PROCEDURE — 3079F DIAST BP 80-89 MM HG: CPT

## 2023-08-02 PROCEDURE — 90677 PCV20 VACCINE IM: CPT

## 2023-08-02 PROCEDURE — 82570 ASSAY OF URINE CREATININE: CPT

## 2023-08-02 PROCEDURE — 99214 OFFICE O/P EST MOD 30 MIN: CPT | Mod: 25

## 2023-08-02 PROCEDURE — 3075F SYST BP GE 130 - 139MM HG: CPT

## 2023-08-02 PROCEDURE — 92250 FUNDUS PHOTOGRAPHY W/I&R: CPT | Mod: TC

## 2023-08-02 PROCEDURE — 82043 UR ALBUMIN QUANTITATIVE: CPT

## 2023-08-02 PROCEDURE — 90471 IMMUNIZATION ADMIN: CPT

## 2023-08-02 PROCEDURE — 83036 HEMOGLOBIN GLYCOSYLATED A1C: CPT

## 2023-08-02 ASSESSMENT — ENCOUNTER SYMPTOMS
VOMITING: 0
WEIGHT LOSS: 0
WEAKNESS: 0
FEVER: 0
DIARRHEA: 0
MYALGIAS: 0
CONSTIPATION: 0
ABDOMINAL PAIN: 0
BLURRED VISION: 0
COUGH: 0
DIZZINESS: 0
NAUSEA: 0
SHORTNESS OF BREATH: 0
HEADACHES: 0
CHILLS: 0

## 2023-08-02 ASSESSMENT — FIBROSIS 4 INDEX: FIB4 SCORE: 0.42

## 2023-08-02 ASSESSMENT — PATIENT HEALTH QUESTIONNAIRE - PHQ9: CLINICAL INTERPRETATION OF PHQ2 SCORE: 0

## 2023-08-02 NOTE — ASSESSMENT & PLAN NOTE
Chronic condition of uncertain progression  --Exercise: At least 150 minutes of moderate aerobic activity per week or 75 minutes of vigorous aerobic activity per week, +2 days/week of strength training  - Healthy lifestyle and eating habits: Mediterranean-based diet (rich in fruits, vegetables, nuts and healthy oils), proper hydration and avoiding sugary beverages, adequate sleep hygiene-(allowing 7 to 8 hours of overnight sleep).  -Repeat lipid panel at next lab draw

## 2023-08-02 NOTE — PROGRESS NOTES
Subjective:     CC: diabetes    HPI:   Azam presents today with    Problem   Dyslipidemia    Not on a statin   -elevated      Diabetes (Hcc)    Diagnosed June 2022.     -Seeing endocrinology for this condition  -He has a Dexcom 6 CGM, fasting sugars 110 on average  -Taking Novolog thru insulin pump based on BS levels, also has manual mode but rarely has to deliver extra insulin.  -Recently had difficulty with feeling nauseated upon waking but did not notice if BS levels were low. Once he burped he felt better. Feels this could be related to how he was eating the day before. He has better spaced his meals and eats more frequently and this has improved. He also makes sure to eat prior to work. He tries to eat well balanced, not counting carbs at this time. Otherwise doing well.          Health Maintenance: Completed    ROS:  Review of Systems   Constitutional:  Negative for chills, fever, malaise/fatigue and weight loss.   Eyes:  Negative for blurred vision.   Respiratory:  Negative for cough and shortness of breath.    Cardiovascular:  Negative for chest pain.   Gastrointestinal:  Negative for abdominal pain, constipation, diarrhea, nausea and vomiting.   Musculoskeletal:  Negative for myalgias.   Neurological:  Negative for dizziness, weakness and headaches.       Objective:     Exam:  /80 (BP Location: Left arm, Patient Position: Sitting, BP Cuff Size: Adult)   Pulse 86   Temp 36.5 °C (97.7 °F) (Temporal)   Ht 1.829 m (6')   Wt 80.6 kg (177 lb 12.8 oz)   SpO2 96%   BMI 24.11 kg/m²  Body mass index is 24.11 kg/m².    Physical Exam  Constitutional:       Appearance: Normal appearance.   HENT:      Head: Normocephalic.   Eyes:      Conjunctiva/sclera: Conjunctivae normal.      Pupils: Pupils are equal, round, and reactive to light.   Cardiovascular:      Rate and Rhythm: Normal rate and regular rhythm.      Heart sounds: No murmur heard.  Pulmonary:      Effort: Pulmonary effort is normal. No  respiratory distress.      Breath sounds: Normal breath sounds.   Musculoskeletal:         General: Normal range of motion.   Skin:     General: Skin is warm and dry.   Neurological:      General: No focal deficit present.      Mental Status: He is alert and oriented to person, place, and time.   Psychiatric:         Mood and Affect: Mood normal.         Behavior: Behavior normal.     Monofilament testing with a 10 gram force: sensation intact: intact bilaterally  Visual Inspection: Feet without maceration, ulcers, fissures.  Pedal pulses: intact bilaterally      Labs:   Lab Results   Component Value Date/Time    CHOLSTRLTOT 206 (H) 06/21/2022 09:10 AM     (H) 06/21/2022 09:10 AM    HDL 57 06/21/2022 09:10 AM    TRIGLYCERIDE 61 06/21/2022 09:10 AM       Lab Results   Component Value Date/Time    SODIUM 139 06/09/2022 02:22 AM    POTASSIUM 3.3 (L) 06/09/2022 02:22 AM    CHLORIDE 101 06/09/2022 02:22 AM    CO2 24 06/09/2022 02:22 AM    GLUCOSE 184 (H) 06/09/2022 02:22 AM    BUN 13 06/09/2022 02:22 AM    CREATININE 0.85 06/09/2022 02:22 AM     Lab Results   Component Value Date/Time    ALKPHOSPHAT 109 (H) 06/09/2022 02:22 AM    ASTSGOT 18 06/09/2022 02:22 AM    ALTSGPT 23 06/09/2022 02:22 AM    TBILIRUBIN 0.4 06/09/2022 02:22 AM      Lab Results   Component Value Date/Time    HBA1C 8.5 (A) 08/03/2022 11:05 AM    HBA1C 17.1 (H) 06/08/2022 01:24 PM     No results found for: TSH  Lab Results   Component Value Date/Time    FREET4 1.34 07/26/2022 04:45 PM     No results found for: CBC      Assessment & Plan: Medical Decision Making     22 y.o. male with the following -     Problem List Items Addressed This Visit       Diabetes (HCC)     Chronic, stable A1C improved at 7.5%, down from 8.5% last August. Encouraged to have tighter control below 7%. Patient reports he did not have his insulin for a few weeks about 1-2 months ago.  Continue Novolog per insulin infusion pump as programed based upon CGM  readings  -Monofilament obtained (see PE)  -Microalbumin obtained in clinic  -Labs ordered  -Continue healthy lifestyle as discussed  -Continue to follow with endocrinology as scheduled         Relevant Orders    POCT  A1C    POCT Retinal Eye Exam    Diabetic Monofilament LE Exam (Completed)    Comp Metabolic Panel    Lipid Profile    MICROALBUMIN CREAT RATIO URINE    HEMOGLOBIN A1C    Dyslipidemia     Chronic condition of uncertain progression  --Exercise: At least 150 minutes of moderate aerobic activity per week or 75 minutes of vigorous aerobic activity per week, +2 days/week of strength training  - Healthy lifestyle and eating habits: Mediterranean-based diet (rich in fruits, vegetables, nuts and healthy oils), proper hydration and avoiding sugary beverages, adequate sleep hygiene-(allowing 7 to 8 hours of overnight sleep).  -Repeat lipid panel at next lab draw          Other Visit Diagnoses       Need for hepatitis C screening test        Relevant Orders    HEP C VIRUS ANTIBODY    Elevated LDL cholesterol level        Relevant Orders    Comp Metabolic Panel    Lipid Profile    Need for vaccination        Relevant Orders    Pneumococcal Conjugate Vaccine 20-Valent (19 yrs+)            Differential diagnosis, natural history, supportive care, and indications for immediate follow-up discussed.  Shared decision making approach utilized, and patient is amendable with plan of care.  Patient understands to return to clinic or go to the emergency department if symptoms worsen. All questions and concerns addressed to the best of my knowledge.    Return in about 4 months (around 12/2/2023).    Please note that this dictation was created using voice recognition software. I have made every reasonable attempt to correct obvious errors, but I expect that there are errors of grammar and possibly content that I did not discover before finalizing the note.

## 2023-08-02 NOTE — ASSESSMENT & PLAN NOTE
Chronic, stable A1C improved at 7.5%, down from 8.5% last August. Encouraged to have tighter control below 7%. Patient reports he did not have his insulin for a few weeks about 1-2 months ago.  Continue Novolog per insulin infusion pump as programed based upon CGM readings  -Monofilament obtained (see PE)  -Microalbumin obtained in clinic  -Labs ordered  -Continue healthy lifestyle as discussed  -Continue to follow with endocrinology as scheduled

## 2023-08-04 LAB — RETINAL SCREEN: NEGATIVE

## 2023-09-29 ENCOUNTER — TELEPHONE (OUTPATIENT)
Dept: ENDOCRINOLOGY | Facility: MEDICAL CENTER | Age: 23
End: 2023-09-29
Payer: COMMERCIAL

## 2023-09-29 NOTE — TELEPHONE ENCOUNTER
Coverage information from [employer]    BIN: [id number], PCN: [id number], Group: [id number], ID: [id number]. Person code: [id number]    PA submitted for dexcom g7 submitted via covermymeds. Key code: [id number]

## 2023-09-29 NOTE — TELEPHONE ENCOUNTER
VOICEMAIL  1. Caller Name: Aleta                      Call Back Number: [phone]    2. Message: calling to request a PA for dexcom supplies.     3. Patient approves office to leave a detailed voicemail/MyChart message: N\A

## 2023-10-06 ENCOUNTER — TELEMEDICINE (OUTPATIENT)
Dept: MEDICAL GROUP | Facility: PHYSICIAN GROUP | Age: 23
End: 2023-10-06
Payer: COMMERCIAL

## 2023-10-06 VITALS — BODY MASS INDEX: 22.53 KG/M2 | WEIGHT: 170 LBS | HEIGHT: 73 IN

## 2023-10-06 DIAGNOSIS — R11.0 NAUSEA: ICD-10-CM

## 2023-10-06 PROCEDURE — 99214 OFFICE O/P EST MOD 30 MIN: CPT | Mod: 95

## 2023-10-06 RX ORDER — ONDANSETRON 4 MG/1
4 TABLET, ORALLY DISINTEGRATING ORAL EVERY 6 HOURS PRN
Qty: 10 TABLET | Refills: 0 | Status: SHIPPED | OUTPATIENT
Start: 2023-10-06

## 2023-10-06 ASSESSMENT — ENCOUNTER SYMPTOMS
COUGH: 0
SHORTNESS OF BREATH: 0
WEAKNESS: 0
CHILLS: 0
MYALGIAS: 0
NAUSEA: 1
CONSTIPATION: 0
DIARRHEA: 0
FEVER: 0
DIZZINESS: 0
WEIGHT LOSS: 0
BLURRED VISION: 0
HEADACHES: 0
VOMITING: 1
ABDOMINAL PAIN: 0

## 2023-10-06 ASSESSMENT — FIBROSIS 4 INDEX: FIB4 SCORE: 0.42

## 2023-10-06 NOTE — Clinical Note
Azam Bautista is having great difficulty getting an appointment with endo.  It appears Lavell is off his MyChart and he has called the office repeatedly without luck. Would you be able to help get him an appointment. I believe he is having gastroparesis.  Yours in good health,  Brent Michelle, BRADLEY, APRN Family Medicine Nurse Practitioner [facility] [phone]

## 2023-10-07 NOTE — PROGRESS NOTES
Virtual Visit: Established Patient   This visit was conducted via Zoom using secure and encrypted videoconferencing technology.   The patient was in their home in the [facility].    The patient's identity was confirmed and verbal consent was obtained for this virtual visit.    Subjective:   CC:   Chief Complaint   Patient presents with    Nausea     In the am     Azam Myles is a 22 y.o. male presenting for evaluation and management of:    Problem   Nausea    Onset for about a month  Occurs in the morning typically about 20 minutes after waking, has had a few instances of vomiting, occ early saeity.   This occurs about 5 times per week. He notices blood sugar will be slightly elevated at 180-200, labile at times. BS level fasting 100-140. Nausea is brief for about 30 minutes at a time.    -He has a continuous glucose monitor and insulin pump  -Has not been able to get into Endocrinologist             ROS   Review of Systems   Constitutional:  Negative for chills, fever, malaise/fatigue and weight loss.   Eyes:  Negative for blurred vision.   Respiratory:  Negative for cough and shortness of breath.    Cardiovascular:  Negative for chest pain.   Gastrointestinal:  Positive for nausea and vomiting. Negative for abdominal pain, constipation and diarrhea.   Musculoskeletal:  Negative for myalgias.   Neurological:  Negative for dizziness, weakness and headaches.         Current medicines (including changes today)  Current Outpatient Medications   Medication Sig Dispense Refill    ondansetron (ZOFRAN ODT) 4 MG TABLET DISPERSIBLE Take 1 Tablet by mouth every 6 hours as needed for Nausea/Vomiting. 10 Tablet 0    NOVOLOG 100 UNIT/ML Solution Inject 55 Units under the skin 3 times a day before meals. 50 mL 3    insulin aspart (NOVOLOG FLEXPEN) 100 UNIT/ML injection PEN Inject  under the skin. 15 mL 0    Continuous Blood Gluc Transmit (DEXCOM G6 TRANSMITTER) Misc 1 Applicator every 3 months. 2 Each 2    Continuous  "Blood Gluc Sensor (DEXCOM G6 SENSOR) Misc 1 Applicator every 10 days. 9 Each 3    Alcohol Swabs Wipe site with prep pad prior to injection. 100 Each 0     No current facility-administered medications for this visit.       Patient Active Problem List    Diagnosis Date Noted    Nausea 10/06/2023    Dyslipidemia 08/02/2023    Diabetes (HCC) 06/08/2022    Urinary frequency 06/07/2022    Elevated glucose 06/07/2022    Ketonuria 06/07/2022    Syncope and collapse 03/06/2022    Scoliosis 12/03/2015        Objective:   Ht 1.854 m (6' 1\")   Wt 77.1 kg (170 lb)   BMI 22.43 kg/m²     Physical Exam:  Constitutional: Alert, no distress, well-groomed.  Skin: No rashes in visible areas.  Eye: Round. Conjunctiva clear, lids normal. No icterus.   ENMT: Lips pink without lesions, good dentition, moist mucous membranes. Phonation normal.  Neck: No masses, no thyromegaly. Moves freely without pain.  Respiratory: Unlabored respiratory effort, no cough or audible wheeze  Psych: Alert and oriented x3, normal affect and mood.     Assessment and Plan:   The following treatment plan was discussed:     1. Nausea  - ondansetron (ZOFRAN ODT) 4 MG TABLET DISPERSIBLE; Take 1 Tablet by mouth every 6 hours as needed for Nausea/Vomiting.  Dispense: 10 Tablet; Refill: 0  - Referral to Gastroenterology    Problem List Items Addressed This Visit       Nausea     Undiagnosed condition of uncertain prognosis for about the past month getting progressively worse  -Zofran 4 mg to take every 6 hours if needed for nausea prescribed  -Recommend follow-up with endocrinologist, will attempt to contact them next week to try to get patient an appointment.  Note sent to diabetic educator to also try to see if she can get him into have an appointment.  -I have advised patient to journal food intake, time, type of food, blood sugar levels daily to assess if there are triggers or patterns to the days that he has nausea versus the days that he does not have " nausea  -ED precautions given and known for worsening or progression of this condition  -Referral placed to gastroenterology as I believe he may be suffering from gastroparesis secondary to type 1 diabetes         Relevant Medications    ondansetron (ZOFRAN ODT) 4 MG TABLET DISPERSIBLE    Other Relevant Orders    Referral to Gastroenterology         Follow-up: Return if symptoms worsen or fail to improve.

## 2023-10-07 NOTE — ASSESSMENT & PLAN NOTE
Undiagnosed condition of uncertain prognosis for about the past month getting progressively worse  -Zofran 4 mg to take every 6 hours if needed for nausea prescribed  -Recommend follow-up with endocrinologist, will attempt to contact them next week to try to get patient an appointment.  Note sent to diabetic educator to also try to see if she can get him into have an appointment.  -I have advised patient to journal food intake, time, type of food, blood sugar levels daily to assess if there are triggers or patterns to the days that he has nausea versus the days that he does not have nausea  -ED precautions given and known for worsening or progression of this condition  -Referral placed to gastroenterology as I believe he may be suffering from gastroparesis secondary to type 1 diabetes

## 2024-02-10 ENCOUNTER — APPOINTMENT (OUTPATIENT)
Dept: RADIOLOGY | Facility: MEDICAL CENTER | Age: 24
End: 2024-02-10
Attending: EMERGENCY MEDICINE
Payer: COMMERCIAL

## 2024-02-10 ENCOUNTER — HOSPITAL ENCOUNTER (EMERGENCY)
Facility: MEDICAL CENTER | Age: 24
End: 2024-02-10
Attending: EMERGENCY MEDICINE
Payer: COMMERCIAL

## 2024-02-10 ENCOUNTER — OFFICE VISIT (OUTPATIENT)
Dept: URGENT CARE | Facility: CLINIC | Age: 24
End: 2024-02-10
Payer: COMMERCIAL

## 2024-02-10 VITALS
DIASTOLIC BLOOD PRESSURE: 80 MMHG | OXYGEN SATURATION: 96 % | RESPIRATION RATE: 16 BRPM | TEMPERATURE: 98.2 F | HEART RATE: 76 BPM | SYSTOLIC BLOOD PRESSURE: 139 MMHG

## 2024-02-10 VITALS
RESPIRATION RATE: 18 BRPM | HEART RATE: 93 BPM | BODY MASS INDEX: 23.86 KG/M2 | OXYGEN SATURATION: 98 % | DIASTOLIC BLOOD PRESSURE: 90 MMHG | WEIGHT: 180 LBS | TEMPERATURE: 98.5 F | HEIGHT: 73 IN | SYSTOLIC BLOOD PRESSURE: 158 MMHG

## 2024-02-10 DIAGNOSIS — R11.2 NAUSEA AND VOMITING, UNSPECIFIED VOMITING TYPE: ICD-10-CM

## 2024-02-10 DIAGNOSIS — R10.30 LOWER ABDOMINAL PAIN: ICD-10-CM

## 2024-02-10 DIAGNOSIS — R11.0 NAUSEA: ICD-10-CM

## 2024-02-10 LAB
ALBUMIN SERPL BCP-MCNC: 5.1 G/DL (ref 3.2–4.9)
ALBUMIN/GLOB SERPL: 1.8 G/DL
ALP SERPL-CCNC: 85 U/L (ref 30–99)
ALT SERPL-CCNC: 19 U/L (ref 2–50)
ANION GAP SERPL CALC-SCNC: 13 MMOL/L (ref 7–16)
APPEARANCE UR: CLEAR
APPEARANCE UR: NORMAL
AST SERPL-CCNC: 36 U/L (ref 12–45)
BASOPHILS # BLD AUTO: 0.3 % (ref 0–1.8)
BASOPHILS # BLD: 0.04 K/UL (ref 0–0.12)
BILIRUB SERPL-MCNC: 0.8 MG/DL (ref 0.1–1.5)
BILIRUB UR QL STRIP.AUTO: NEGATIVE
BILIRUB UR STRIP-MCNC: NEGATIVE MG/DL
BUN SERPL-MCNC: 11 MG/DL (ref 8–22)
CALCIUM ALBUM COR SERPL-MCNC: 8.7 MG/DL (ref 8.5–10.5)
CALCIUM SERPL-MCNC: 9.6 MG/DL (ref 8.4–10.2)
CHLORIDE SERPL-SCNC: 97 MMOL/L (ref 96–112)
CO2 SERPL-SCNC: 25 MMOL/L (ref 20–33)
COLOR UR AUTO: YELLOW
COLOR UR: YELLOW
CREAT SERPL-MCNC: 1.07 MG/DL (ref 0.5–1.4)
EKG IMPRESSION: NORMAL
EOSINOPHIL # BLD AUTO: 0.02 K/UL (ref 0–0.51)
EOSINOPHIL NFR BLD: 0.2 % (ref 0–6.9)
ERYTHROCYTE [DISTWIDTH] IN BLOOD BY AUTOMATED COUNT: 41 FL (ref 35.9–50)
GFR SERPLBLD CREATININE-BSD FMLA CKD-EPI: 101 ML/MIN/1.73 M 2
GLOBULIN SER CALC-MCNC: 2.8 G/DL (ref 1.9–3.5)
GLUCOSE SERPL-MCNC: 158 MG/DL (ref 65–99)
GLUCOSE UR STRIP.AUTO-MCNC: NEGATIVE MG/DL
GLUCOSE UR STRIP.AUTO-MCNC: NEGATIVE MG/DL
HCT VFR BLD AUTO: 47.1 % (ref 42–52)
HGB BLD-MCNC: 16.1 G/DL (ref 14–18)
IMM GRANULOCYTES # BLD AUTO: 0.03 K/UL (ref 0–0.11)
IMM GRANULOCYTES NFR BLD AUTO: 0.3 % (ref 0–0.9)
KETONES UR STRIP.AUTO-MCNC: NEGATIVE MG/DL
KETONES UR STRIP.AUTO-MCNC: NEGATIVE MG/DL
LACTATE SERPL-SCNC: 1.4 MMOL/L (ref 0.5–2)
LEUKOCYTE ESTERASE UR QL STRIP.AUTO: NEGATIVE
LEUKOCYTE ESTERASE UR QL STRIP.AUTO: NORMAL
LIPASE SERPL-CCNC: 9 U/L (ref 11–82)
LYMPHOCYTES # BLD AUTO: 1.41 K/UL (ref 1–4.8)
LYMPHOCYTES NFR BLD: 11.9 % (ref 22–41)
MCH RBC QN AUTO: 31.2 PG (ref 27–33)
MCHC RBC AUTO-ENTMCNC: 34.2 G/DL (ref 32.3–36.5)
MCV RBC AUTO: 91.3 FL (ref 81.4–97.8)
MICRO URNS: NORMAL
MONOCYTES # BLD AUTO: 0.65 K/UL (ref 0–0.85)
MONOCYTES NFR BLD AUTO: 5.5 % (ref 0–13.4)
NEUTROPHILS # BLD AUTO: 9.69 K/UL (ref 1.82–7.42)
NEUTROPHILS NFR BLD: 81.8 % (ref 44–72)
NITRITE UR QL STRIP.AUTO: NEGATIVE
NITRITE UR QL STRIP.AUTO: NEGATIVE
NRBC # BLD AUTO: 0 K/UL
NRBC BLD-RTO: 0 /100 WBC (ref 0–0.2)
PH UR STRIP.AUTO: 6 [PH] (ref 5–8)
PH UR STRIP.AUTO: 6.5 [PH] (ref 5–8)
PLATELET # BLD AUTO: 287 K/UL (ref 164–446)
PMV BLD AUTO: 9.9 FL (ref 9–12.9)
POTASSIUM SERPL-SCNC: 3.8 MMOL/L (ref 3.6–5.5)
PROT SERPL-MCNC: 7.9 G/DL (ref 6–8.2)
PROT UR QL STRIP: NEGATIVE MG/DL
PROT UR QL STRIP: NEGATIVE MG/DL
RBC # BLD AUTO: 5.16 M/UL (ref 4.7–6.1)
RBC UR QL AUTO: NEGATIVE
RBC UR QL AUTO: NORMAL
SODIUM SERPL-SCNC: 135 MMOL/L (ref 135–145)
SP GR UR STRIP.AUTO: 1.03
SP GR UR STRIP.AUTO: <=1.005
UROBILINOGEN UR STRIP-MCNC: 0.2 MG/DL
WBC # BLD AUTO: 11.8 K/UL (ref 4.8–10.8)

## 2024-02-10 PROCEDURE — 87040 BLOOD CULTURE FOR BACTERIA: CPT | Mod: 91

## 2024-02-10 PROCEDURE — 99285 EMERGENCY DEPT VISIT HI MDM: CPT

## 2024-02-10 PROCEDURE — 74177 CT ABD & PELVIS W/CONTRAST: CPT

## 2024-02-10 PROCEDURE — 3077F SYST BP >= 140 MM HG: CPT | Performed by: NURSE PRACTITIONER

## 2024-02-10 PROCEDURE — 700117 HCHG RX CONTRAST REV CODE 255: Performed by: EMERGENCY MEDICINE

## 2024-02-10 PROCEDURE — 87086 URINE CULTURE/COLONY COUNT: CPT

## 2024-02-10 PROCEDURE — 83690 ASSAY OF LIPASE: CPT

## 2024-02-10 PROCEDURE — 81002 URINALYSIS NONAUTO W/O SCOPE: CPT | Performed by: NURSE PRACTITIONER

## 2024-02-10 PROCEDURE — 80053 COMPREHEN METABOLIC PANEL: CPT

## 2024-02-10 PROCEDURE — 96374 THER/PROPH/DIAG INJ IV PUSH: CPT

## 2024-02-10 PROCEDURE — 700111 HCHG RX REV CODE 636 W/ 250 OVERRIDE (IP): Mod: JZ | Performed by: EMERGENCY MEDICINE

## 2024-02-10 PROCEDURE — 96375 TX/PRO/DX INJ NEW DRUG ADDON: CPT

## 2024-02-10 PROCEDURE — 99214 OFFICE O/P EST MOD 30 MIN: CPT | Performed by: NURSE PRACTITIONER

## 2024-02-10 PROCEDURE — 83605 ASSAY OF LACTIC ACID: CPT

## 2024-02-10 PROCEDURE — 3080F DIAST BP >= 90 MM HG: CPT | Performed by: NURSE PRACTITIONER

## 2024-02-10 PROCEDURE — 93005 ELECTROCARDIOGRAM TRACING: CPT | Performed by: EMERGENCY MEDICINE

## 2024-02-10 PROCEDURE — 36415 COLL VENOUS BLD VENIPUNCTURE: CPT

## 2024-02-10 PROCEDURE — 700105 HCHG RX REV CODE 258: Performed by: EMERGENCY MEDICINE

## 2024-02-10 PROCEDURE — 71045 X-RAY EXAM CHEST 1 VIEW: CPT

## 2024-02-10 PROCEDURE — 85025 COMPLETE CBC W/AUTO DIFF WBC: CPT

## 2024-02-10 PROCEDURE — 81003 URINALYSIS AUTO W/O SCOPE: CPT

## 2024-02-10 RX ORDER — HALOPERIDOL 5 MG/ML
5 INJECTION INTRAMUSCULAR ONCE
Status: COMPLETED | OUTPATIENT
Start: 2024-02-10 | End: 2024-02-10

## 2024-02-10 RX ORDER — DIPHENHYDRAMINE HYDROCHLORIDE 50 MG/ML
25 INJECTION INTRAMUSCULAR; INTRAVENOUS ONCE
Status: COMPLETED | OUTPATIENT
Start: 2024-02-10 | End: 2024-02-10

## 2024-02-10 RX ORDER — SODIUM CHLORIDE, SODIUM LACTATE, POTASSIUM CHLORIDE, AND CALCIUM CHLORIDE .6; .31; .03; .02 G/100ML; G/100ML; G/100ML; G/100ML
30 INJECTION, SOLUTION INTRAVENOUS ONCE
Status: COMPLETED | OUTPATIENT
Start: 2024-02-10 | End: 2024-02-10

## 2024-02-10 RX ORDER — PROMETHAZINE HYDROCHLORIDE 25 MG/1
25 SUPPOSITORY RECTAL EVERY 6 HOURS PRN
Qty: 10 SUPPOSITORY | Refills: 0 | Status: SHIPPED | OUTPATIENT
Start: 2024-02-10

## 2024-02-10 RX ADMIN — DIPHENHYDRAMINE HYDROCHLORIDE 25 MG: 50 INJECTION, SOLUTION INTRAMUSCULAR; INTRAVENOUS at 14:30

## 2024-02-10 RX ADMIN — IOHEXOL 100 ML: 350 INJECTION, SOLUTION INTRAVENOUS at 14:55

## 2024-02-10 RX ADMIN — SODIUM CHLORIDE, POTASSIUM CHLORIDE, SODIUM LACTATE AND CALCIUM CHLORIDE 2448 ML: 600; 310; 30; 20 INJECTION, SOLUTION INTRAVENOUS at 14:25

## 2024-02-10 RX ADMIN — HALOPERIDOL LACTATE 5 MG: 5 INJECTION, SOLUTION INTRAMUSCULAR at 14:27

## 2024-02-10 ASSESSMENT — PAIN DESCRIPTION - DESCRIPTORS: DESCRIPTORS: ACHING

## 2024-02-10 ASSESSMENT — FIBROSIS 4 INDEX: FIB4 SCORE: 0.44

## 2024-02-10 NOTE — ED PROVIDER NOTES
"ED Provider Note    CHIEF COMPLAINT  Chief Complaint   Patient presents with    Abdominal Pain    N/V       EXTERNAL RECORDS REVIEWED  Outpatient Notes seen in urgent care earlier today for abdominal pain, constipation, vomiting.  He overall looked well and UA did not reveal ketones.  The patient requested to go to the emergency department from urgent care.    HPI/ROS  LIMITATION TO HISTORY   Select: : None  OUTSIDE HISTORIAN(S):  None    Azam Myles is a 23 y.o. male with a history of IDDM who presents with a chief complaint of abdominal pain, nausea, and vomiting that has been ongoing for the past 3 to 4 days.  He states that he has been able to keep down things such as smoothies and has trialed Zofran which has been occasionally helpful.  He has an \"ache\" just under his umbilicus but denies any radiation of the pain, fevers, dysuria, hematuria.  He has hardly eaten anything over the past several days.  He has a CGM in place and has been taking appropriate insulin with his blood glucose levels being in the 120s to 130s.  He initially went to urgent care where a urinalysis was collected and did not reveal acute abnormality.  It was recommended that he come to the ER if his symptoms persist but he came directly to the emergency department from urgent care due to his already persistent symptoms.    PAST MEDICAL HISTORY   has a past medical history of Pediatric asthma, mild intermittent, uncomplicated, Pediatric asthma, mild intermittent, uncomplicated (03/06/2022), and Syncope.    SURGICAL HISTORY  patient denies any surgical history    FAMILY HISTORY  Family History   Problem Relation Age of Onset    No Known Problems Mother     No Known Problems Father     No Known Problems Sister     Diabetes Maternal Grandmother        SOCIAL HISTORY  Social History     Tobacco Use    Smoking status: Never    Smokeless tobacco: Never   Vaping Use    Vaping Use: Never used   Substance and Sexual Activity    Alcohol use: Never "    Drug use: Yes     Types: Inhaled, Marijuana    Sexual activity: Yes     Partners: Female     Birth control/protection: Condom       CURRENT MEDICATIONS  Home Medications    **Home medications have not yet been reviewed for this encounter**         ALLERGIES  No Known Allergies    PHYSICAL EXAM  VITAL SIGNS: BP (!) 143/97   Pulse (!) 108   Temp 37.2 °C (99 °F) (Temporal)   Resp 17   SpO2 97%    Physical Exam  Vitals and nursing note reviewed.   Constitutional:       Appearance: He is well-developed.   HENT:      Head: Normocephalic and atraumatic.      Mouth/Throat:      Comments: Dry mucous membranes  Eyes:      Extraocular Movements: Extraocular movements intact.      Pupils: Pupils are equal, round, and reactive to light.   Cardiovascular:      Rate and Rhythm: Regular rhythm. Tachycardia present.   Pulmonary:      Effort: Pulmonary effort is normal.      Breath sounds: Normal breath sounds.   Abdominal:      General: Abdomen is flat.      Comments: Soft.  Very mild periumbilical tenderness without peritoneal signs.   Skin:     General: Skin is warm and dry.   Neurological:      General: No focal deficit present.      Mental Status: He is alert and oriented to person, place, and time.   Psychiatric:         Mood and Affect: Mood normal.         Behavior: Behavior normal.       DIAGNOSTIC STUDIES / PROCEDURES  LABS  Results for orders placed or performed during the hospital encounter of 02/10/24   Lactic Acid   Result Value Ref Range    Lactic Acid 1.4 0.5 - 2.0 mmol/L   CBC with Differential   Result Value Ref Range    WBC 11.8 (H) 4.8 - 10.8 K/uL    RBC 5.16 4.70 - 6.10 M/uL    Hemoglobin 16.1 14.0 - 18.0 g/dL    Hematocrit 47.1 42.0 - 52.0 %    MCV 91.3 81.4 - 97.8 fL    MCH 31.2 27.0 - 33.0 pg    MCHC 34.2 32.3 - 36.5 g/dL    RDW 41.0 35.9 - 50.0 fL    Platelet Count 287 164 - 446 K/uL    MPV 9.9 9.0 - 12.9 fL    Neutrophils-Polys 81.80 (H) 44.00 - 72.00 %    Lymphocytes 11.90 (L) 22.00 - 41.00 %     Monocytes 5.50 0.00 - 13.40 %    Eosinophils 0.20 0.00 - 6.90 %    Basophils 0.30 0.00 - 1.80 %    Immature Granulocytes 0.30 0.00 - 0.90 %    Nucleated RBC 0.00 0.00 - 0.20 /100 WBC    Neutrophils (Absolute) 9.69 (H) 1.82 - 7.42 K/uL    Lymphs (Absolute) 1.41 1.00 - 4.80 K/uL    Monos (Absolute) 0.65 0.00 - 0.85 K/uL    Eos (Absolute) 0.02 0.00 - 0.51 K/uL    Baso (Absolute) 0.04 0.00 - 0.12 K/uL    Immature Granulocytes (abs) 0.03 0.00 - 0.11 K/uL    NRBC (Absolute) 0.00 K/uL   Complete Metabolic Panel   Result Value Ref Range    Sodium 135 135 - 145 mmol/L    Potassium 3.8 3.6 - 5.5 mmol/L    Chloride 97 96 - 112 mmol/L    Co2 25 20 - 33 mmol/L    Anion Gap 13.0 7.0 - 16.0    Glucose 158 (H) 65 - 99 mg/dL    Bun 11 8 - 22 mg/dL    Creatinine 1.07 0.50 - 1.40 mg/dL    Calcium 9.6 8.4 - 10.2 mg/dL    Correct Calcium 8.7 8.5 - 10.5 mg/dL    AST(SGOT) 36 12 - 45 U/L    ALT(SGPT) 19 2 - 50 U/L    Alkaline Phosphatase 85 30 - 99 U/L    Total Bilirubin 0.8 0.1 - 1.5 mg/dL    Albumin 5.1 (H) 3.2 - 4.9 g/dL    Total Protein 7.9 6.0 - 8.2 g/dL    Globulin 2.8 1.9 - 3.5 g/dL    A-G Ratio 1.8 g/dL   Urinalysis    Specimen: Urine, Clean Catch   Result Value Ref Range    Color Yellow     Character Clear     Specific Gravity <=1.005 <1.035    Ph 6.5 5.0 - 8.0    Glucose Negative Negative mg/dL    Ketones Negative Negative mg/dL    Protein Negative Negative mg/dL    Bilirubin Negative Negative    Nitrite Negative Negative    Leukocyte Esterase Negative Negative    Occult Blood Negative Negative    Micro Urine Req see below    ESTIMATED GFR   Result Value Ref Range    GFR (CKD-EPI) 101 >60 mL/min/1.73 m 2   EKG   Result Value Ref Range    Report       [facility] Emergency Dept.    Test Date:  2024-02-10  Pt Name:    AILEEN BONILLA               Department: EDS  MRN:        [id number]                      [facility]  Gender:     Male                         Technician: [id number]  :         2000                   Requested By:ANN OBANDO  Order #:    [id number]                    Reading MD: Ann Obando MD    Measurements  Intervals                                Axis  Rate:       90                           P:          13  WA:         140                          QRS:        67  QRSD:       98                           T:          17  QT:         332  QTc:        407    Interpretive Statements  Sinus rhythm  RSR' in V1 or V2, right VCD or RVH  Probable left ventricular hypertrophy  No previous ECG available for comparison  Electronically Signed On 02- 16:02:58 PST by Ann Obando MD       RADIOLOGY  I have independently interpreted the diagnostic imaging associated with this visit and am waiting the final reading from the radiologist.   My preliminary interpretation is as follows: No obvious bowel obstruction    Radiologist interpretation:   CT-ABDOMEN-PELVIS WITH   Final Result      1.  No acute abnormality detected.      DX-CHEST-PORTABLE (1 VIEW)   Final Result      No acute cardiac or pulmonary abnormalities are identified.        COURSE & MEDICAL DECISION MAKING    ED Observation Status? No; Patient does not meet criteria for ED Observation.     INITIAL ASSESSMENT, COURSE AND PLAN  Care Narrative: This is a 23-year-old male with a history of IDDM who is here with nausea, vomiting, abdominal pain for the past 3 days.    Differential diagnosis includes, but is not limited to, viral syndrome, appendicitis, colitis, UTI, DKA, hypo-/hyperglycemia, bowel obstruction, gastroparesis, cannabinoid hyperemesis syndrome.    Arrives tachycardic but afebrile with otherwise normal vital signs.  Appears slightly dehydrated with tacky mucous membranes but nontoxic.  Abdomen is soft with mild periumbilical tenderness.  He is not peritoneal.    Started on IV fluids and given a dose of Haldol and Benadryl with complete relief of symptoms.    CBC reveals leukocytosis to 11.8 with neutrophilic  predominance but no bandemia.  Metabolic panel demonstrates normal electrolytes, CO2, anion gap.  Blood glucose elevated to 158.  Renal and liver function are normal.  Lactate is normal.  UA without ketones. Lipase slightly low.    CT of the abdomen/pelvis was obtained.  Pancreas is unremarkable.  No acute abnormality was noted.    Patient was reevaluated at bedside.  He is very drowsy after the medication but symptoms have resolved.  He is tolerating p.o. without any difficulty.  Heart rate has improved with IV fluids.  He has an appointment with GI on Monday and I stressed the importance of follow-up with them as scheduled.  He was given a prescription for Phenergan suppositories.  Discharged in good and stable condition with strict return precautions.    HYDRATION: Based on the patient's presentation of Dehydration the patient was given IV fluids. IV Hydration was used because oral hydration was not adequate alone. Upon recheck following hydration, the patient was improved.    ADDITIONAL PROBLEM LIST  None  DISPOSITION AND DISCUSSIONS  I have discussed management of the patient with the following physicians and AMA's:  None    Discussion of management with other QHP or appropriate source(s): None     Escalation of care considered, and ultimately not performed: N/A.    Barriers to care at this time, including but not limited to:  None .     Decision tools and prescription drugs considered including, but not limited to:  Phenergan suppositories .    FINAL DIAGNOSIS  1. Nausea and vomiting, unspecified vomiting type      Electronically signed by: Devan Hernandez M.D., 2/10/2024 1:30 PM

## 2024-02-10 NOTE — PROGRESS NOTES
Chief Complaint   Patient presents with    Vomiting     X3days, vomiting, lower abdominal pain, constipation, loss of appetite       HISTORY OF PRESENT ILLNESS: Patient is a pleasant 23 y.o. male with a history of type 1 diabetes who presents to urgent care today with GI complaints.  The patient states his symptoms started 2 to 3 days ago with lower abdominal pain.  Symptoms then progressed to nausea and vomiting.  States he vomited approximately 8 times yesterday.  He has also had a loss of appetite.  He has not vomited today but continues to be nauseous.  His current blood sugar is in the low 100s.  States his sugars have been in the mid to low 100s since onset.  Denies any fever.    Patient Active Problem List    Diagnosis Date Noted    Nausea 10/06/2023    Dyslipidemia 08/02/2023    Diabetes (HCC) 06/08/2022    Urinary frequency 06/07/2022    Elevated glucose 06/07/2022    Ketonuria 06/07/2022    Syncope and collapse 03/06/2022    Scoliosis 12/03/2015       Allergies:Patient has no known allergies.    Current Facility-Administered Medications Ordered in Epic   Medication Dose Route Frequency Provider Last Rate Last Admin    LR (Bolus) infusion 2,448 mL  30 mL/kg Intravenous Once Devan Hernandez M.D.         Current Outpatient Medications Ordered in Epic   Medication Sig Dispense Refill    ondansetron (ZOFRAN ODT) 4 MG TABLET DISPERSIBLE Take 1 Tablet by mouth every 6 hours as needed for Nausea/Vomiting. 10 Tablet 0    NOVOLOG 100 UNIT/ML Solution Inject 55 Units under the skin 3 times a day before meals. 50 mL 3    insulin aspart (NOVOLOG FLEXPEN) 100 UNIT/ML injection PEN Inject  under the skin. 15 mL 0    Continuous Blood Gluc Transmit (DEXCOM G6 TRANSMITTER) Misc 1 Applicator every 3 months. 2 Each 2    Continuous Blood Gluc Sensor (DEXCOM G6 SENSOR) Misc 1 Applicator every 10 days. 9 Each 3    Alcohol Swabs Wipe site with prep pad prior to injection. 100 Each 0       Past Medical History:   Diagnosis Date     "Pediatric asthma, mild intermittent, uncomplicated     Pediatric asthma, mild intermittent, uncomplicated 2022    Syncope     childhood       Social History     Tobacco Use    Smoking status: Never    Smokeless tobacco: Never   Vaping Use    Vaping Use: Never used   Substance Use Topics    Alcohol use: Never    Drug use: Yes     Types: Inhaled, Marijuana       Family Status   Relation Name Status    Mo  Alive    Fa  Alive    Sis  Alive    MGMo       Family History   Problem Relation Age of Onset    No Known Problems Mother     No Known Problems Father     No Known Problems Sister     Diabetes Maternal Grandmother        ROS:  Review of Systems   Constitutional: Negative for fever, chills, weight loss, malaise, and fatigue.   HENT: Negative for ear pain, nosebleeds, congestion, sore throat and neck pain.    Eyes: Negative for vision changes.   Neuro: Negative for headache, sensory changes, weakness, seizure, LOC.   Cardiovascular: Negative for chest pain, palpitations, orthopnea and leg swelling.   Respiratory: Negative for cough, sputum production, shortness of breath and wheezing.   Gastrointestinal: Positive for abdominal pain, nausea, vomiting.  Negative for diarrhea.   Genitourinary: Negative for dysuria, urgency and frequency.  Musculoskeletal: Negative for falls, neck pain, back pain, joint pain, myalgias.   Skin: Negative for rash, diaphoresis.     Exam:  BP (!) 158/90 (BP Location: Left arm, Patient Position: Sitting, BP Cuff Size: Adult)   Pulse 93   Temp 36.9 °C (98.5 °F) (Temporal)   Resp 18   Ht 1.854 m (6' 1\")   Wt 81.6 kg (180 lb)   SpO2 98%   General: well-nourished, well-developed male in NAD  Head: normocephalic, atraumatic  Eyes: PERRLA, no conjunctival injection, acuity grossly intact, lids normal.  Ears: normal shape and symmetry, no tenderness, no discharge. External canals are without any significant edema or erythema. Tympanic membranes are without any inflammation, no " effusion. Gross auditory acuity is intact.  Nose: symmetrical without tenderness, no discharge.  Mouth/Throat: reasonable hygiene, no erythema, exudates or tonsillar enlargement.  Neck: no masses, range of motion within normal limits, no tracheal deviation. No obvious thyroid enlargement.   Lymph: no cervical adenopathy. No supraclavicular adenopathy.   Neuro: alert and oriented. Cranial nerves 1-12 grossly intact. No sensory deficit.   Cardiovascular: regular rate and rhythm. No edema.  Pulmonary: no distress. Chest is symmetrical with respiration, no wheezes, crackles, or rhonchi.   Abdomen: soft, no significant tenderness, no guarding, no hepatosplenomegaly.  Musculoskeletal: no clubbing, appropriate muscle tone, gait is stable.  Skin: warm, dry, intact, no clubbing, no cyanosis, no rashes.   Psych: appropriate mood, affect, judgement.       POC urine positive for protein      Assessment/Plan:  1. Lower abdominal pain  POCT Urinalysis      2. Nausea            Patient is a 23-year-old type I diabetic male who presents with lower abdominal pain, nausea, vomiting.  Patient overall looks well and has not had any vomiting today.  Urine is without ketones.  Discussed potential for viral process.  The patient is requesting to go to the emergency department today for further evaluation and care.  This has been discussed with the patient and he states agreement and understanding. The patient is stable to leave POV at this time and will go directly to ED without delay.         Please note that this dictation was created using voice recognition software. I have made every reasonable attempt to correct obvious errors, but I expect that there are errors of grammar and possibly content that I did not discover before finalizing the note.      MARILIA Butler.

## 2024-02-10 NOTE — ED TRIAGE NOTES
Chief Complaint   Patient presents with    Abdominal Pain    N/V      Pt complains of lower abdominal pain, bloating and n.v. worsening over the last 3 days. Pt is a type  1 diabetic.sent by  at [facility] [place] .

## 2024-02-11 NOTE — DISCHARGE INSTRUCTIONS
You were seen in the ER for nausea, vomiting, and abdominal discomfort.  Thankfully your labs and imaging did not reveal an acute abnormality that requires further workup, consultation, or admission to the hospital.  As you are aware, with your diabetes, you are at increased risk for dehydration and subsequent DKA if you stay dehydrated.  That will be very important that you drink 8 ounces of clear, nonalcoholic liquid every hour to maintain your hydration.  I have given you prescription for rectal suppositories for nausea and vomiting, use them as directed.  They may make you sleepy so take the first one when you will be able to rest at home for the next 8 or so hours. Follow up with your gastroenterologist on Monday for further workup. Return to the ER with new or worsening symptoms.

## 2024-02-11 NOTE — ED NOTES
Sugar free jello, ice chips and water provided per erp. Vs as charted, ivf continue, pt restfull, arousable

## 2024-02-11 NOTE — ED NOTES
Resumed care of pt-for d/c. Instructions reviewed with pt, including need to f/u with pcp and gi, clear liquids as tolerated,  meds at [facility] on [address] and take as needed, return for worsening s/s

## 2024-02-12 LAB
BACTERIA UR CULT: NORMAL
SIGNIFICANT IND 70042: NORMAL
SITE SITE: NORMAL
SOURCE SOURCE: NORMAL

## 2024-02-15 ENCOUNTER — OFFICE VISIT (OUTPATIENT)
Dept: ENDOCRINOLOGY | Facility: MEDICAL CENTER | Age: 24
End: 2024-02-15
Payer: COMMERCIAL

## 2024-02-15 VITALS
OXYGEN SATURATION: 98 % | BODY MASS INDEX: 24.96 KG/M2 | HEIGHT: 73 IN | DIASTOLIC BLOOD PRESSURE: 66 MMHG | HEART RATE: 103 BPM | SYSTOLIC BLOOD PRESSURE: 124 MMHG | WEIGHT: 188.3 LBS

## 2024-02-15 DIAGNOSIS — Z79.4 LONG TERM (CURRENT) USE OF INSULIN (HCC): ICD-10-CM

## 2024-02-15 DIAGNOSIS — E10.9 TYPE 1 DIABETES MELLITUS WITHOUT COMPLICATION (HCC): ICD-10-CM

## 2024-02-15 DIAGNOSIS — E78.49 OTHER HYPERLIPIDEMIA: ICD-10-CM

## 2024-02-15 LAB
BACTERIA BLD CULT: NORMAL
BACTERIA BLD CULT: NORMAL
SIGNIFICANT IND 70042: NORMAL
SIGNIFICANT IND 70042: NORMAL
SITE SITE: NORMAL
SITE SITE: NORMAL
SOURCE SOURCE: NORMAL
SOURCE SOURCE: NORMAL

## 2024-02-15 PROCEDURE — 99214 OFFICE O/P EST MOD 30 MIN: CPT

## 2024-02-15 PROCEDURE — 3078F DIAST BP <80 MM HG: CPT

## 2024-02-15 PROCEDURE — 3074F SYST BP LT 130 MM HG: CPT

## 2024-02-15 PROCEDURE — 99213 OFFICE O/P EST LOW 20 MIN: CPT

## 2024-02-15 RX ORDER — INSULIN ASPART 100 [IU]/ML
INJECTION, SOLUTION INTRAVENOUS; SUBCUTANEOUS
Qty: 60 ML | Refills: 11 | Status: SHIPPED | OUTPATIENT
Start: 2024-02-15 | End: 2024-02-19

## 2024-02-15 ASSESSMENT — FIBROSIS 4 INDEX: FIB4 SCORE: 0.6

## 2024-02-15 NOTE — PROGRESS NOTES
Chief Complaint: Follow-up for the following conditions    HPI:   Azam Myles is a 21 y.o. male     1.  Type 1 diabetes mellitus without complication:    POC A1c on 2/15/2024 at 6.2%  POC A1c on 8/3/2022 8.5%  Glycohemoglobin A1c on 6/8/2022 was 17.1%      Medications:  Basaglar 18 units nightly  Novolog 1 unit for every 10 g of Carbohyrdrate with correction factor of 1:50 above 200   Dexcom CGM -Edgpark               He reports hypoglycemic episodes around night -pt is getting tested for gastroparesis secondary to nausea, and he is eating less fats and fiber   He  denies hypoglycemic unawareness.     Referred to nutritional services    Diet: Healthy in general.   Exercise:     Diabetes Complications   He  denies history of retinopathy.    He denies laser eye surgery.   Last eye exam: three months ago. [facility].       Latest Reference Range & Units 07/26/22 16:45   C-Peptide 0.5 - 3.3 ng/mL 0.4 (L)   IA-2, Autoantibody 0.0 - 7.4 U/mL >120.0 (H)      Latest Reference Range & Units 07/26/22 16:45   GEETHA Antibody 0.0 - 5.0 IU/mL <5.0      Latest Reference Range & Units 07/26/22 16:45   TSH 0.380 - 5.330 uIU/mL 1.430   Free T-4 0.93 - 1.70 ng/dL 1.34     No microalbuminuria   Latest Reference Range & Units 06/21/22 09:10   Micro Alb Creat Ratio 0 - 30 mg/g see below   Creatinine, Urine mg/dL 254.99   Microalbumin, Urine Random mg/dL <1.2       2.  Other hyperlipidemia:  Attempting to eat healthier  Increasing his exercise activity   Latest Reference Range & Units 06/21/22 09:10   Cholesterol,Tot 100 - 199 mg/dL 206 (H)   Triglycerides 0 - 149 mg/dL 61   HDL >=40 mg/dL 57   LDL <100 mg/dL 137 (H)     3.  Long term current use of insulin:  Insulin use for diabetes treatment    ROS:     CONS:     No fever, no chills, no weight loss, no fatigue   EYES:      No diplopia, no blurry vision, no redness of eyes, no swelling of eyelids   ENT:    No hearing loss, No ear pain, No sore throat, no dysphagia, no neck  swelling   CV:     No chest pain, no palpitations, no claudication, no orthopnea, no PND   PULM:    No SOB, no cough, no hemoptysis, no wheezing    GI:   No nausea, no vomiting, no diarrhea, no constipation, no bloody stools   :  Passing urine well, no dysuria, no hematuria   ENDO:   No polyuria, no polydipsia, no heat intolerance, no cold intolerance   NEURO: No headaches, no dizziness, no convulsions, no tremors   MUSC:  No joint swellings, no arthralgias, no myalgias, no weakness   SKIN:   No rash, no ulcers, no dry skin   PSYCH:   No depression, no anxiety, no difficulty sleeping       Past Medical History:  Patient Active Problem List    Diagnosis Date Noted    Nausea 10/06/2023    Dyslipidemia 08/02/2023    Diabetes (HCC) 06/08/2022    Urinary frequency 06/07/2022    Elevated glucose 06/07/2022    Ketonuria 06/07/2022    Syncope and collapse 03/06/2022    Scoliosis 12/03/2015       Past Surgical History:  No past surgical history on file.     Allergies:  Patient has no known allergies.     Current Medications:    Current Outpatient Medications:     promethazine (PHENERGAN) 25 MG Suppos, Insert 1 Suppository into the rectum every 6 hours as needed for Nausea/Vomiting., Disp: 10 Suppository, Rfl: 0    ondansetron (ZOFRAN ODT) 4 MG TABLET DISPERSIBLE, Take 1 Tablet by mouth every 6 hours as needed for Nausea/Vomiting., Disp: 10 Tablet, Rfl: 0    NOVOLOG 100 UNIT/ML Solution, Inject 55 Units under the skin 3 times a day before meals., Disp: 50 mL, Rfl: 3    insulin aspart (NOVOLOG FLEXPEN) 100 UNIT/ML injection PEN, Inject  under the skin., Disp: 15 mL, Rfl: 0    Continuous Blood Gluc Transmit (DEXCOM G6 TRANSMITTER) Misc, 1 Applicator every 3 months., Disp: 2 Each, Rfl: 2    Continuous Blood Gluc Sensor (DEXCOM G6 SENSOR) Misc, 1 Applicator every 10 days., Disp: 9 Each, Rfl: 3    Alcohol Swabs, Wipe site with prep pad prior to injection., Disp: 100 Each, Rfl: 0    Social History:  Social History      Socioeconomic History    Marital status: Single     Spouse name: Not on file    Number of children: Not on file    Years of education: Not on file    Highest education level: Not on file   Occupational History    Not on file   Tobacco Use    Smoking status: Never    Smokeless tobacco: Never   Vaping Use    Vaping Use: Never used   Substance and Sexual Activity    Alcohol use: Never    Drug use: Yes     Types: Inhaled, Marijuana    Sexual activity: Yes     Partners: Female     Birth control/protection: Condom   Other Topics Concern    Not on file   Social History Narrative    Not on file     Social Determinants of Health     Financial Resource Strain: Not on file   Food Insecurity: Not on file   Transportation Needs: Not on file   Physical Activity: Not on file   Stress: Not on file   Social Connections: Not on file   Intimate Partner Violence: Not on file   Housing Stability: Not on file        Family History:   Family History   Problem Relation Age of Onset    No Known Problems Mother     No Known Problems Father     No Known Problems Sister     Diabetes Maternal Grandmother        PHYSICAL EXAM:   Vital signs: Virtual visit  GENERAL: Well-developed, well-nourished  in no apparent distress.     Labs:    Lab Results   Component Value Date/Time    CHOLSTRLTOT 206 (H) 06/21/2022 0910    TRIGLYCERIDE 61 06/21/2022 0910    HDL 57 06/21/2022 0910     (H) 06/21/2022 0910       Lab Results   Component Value Date/Time    MALBCRT see below 08/02/2023 11:36 AM    MICROALBUR <1.2 08/02/2023 11:36 AM        ASSESSMENT/PLAN:   1. Type 1 diabetes mellitus without complication (HCC)  Stable with A1c at 6.2%  - Exercise for least 150 minutes/week  - Stable hydration  - Daily feet check  - Maintain a healthy diet, minimal carbohydrate, portion control   --Pt is being evaluated for gastroparesis   --G7 parachutte today     Medication regimen as follows:  Changes as follow:  12 am basal down to 0.25, if low blood continues  at night then pt will lower to 0.2-then decrease for every 3 days of night lows   Added a 0700       Referred to nutritional -phone number given for pt to call and make appt     - MICROALBUMIN CREAT RATIO URINE; Future  - FREE THYROXINE; Future  - TSH; Future  - Comp Metabolic Panel; Future  - VITAMIN D,25 HYDROXY (DEFICIENCY); Future  - POCT Hemoglobin A1C  - insulin aspart (NOVOLOG) 100 UNIT/ML Solution; Up to 60 units/day with insulin pump  Dispense: 60 mL; Refill: 11    2. Other hyperlipidemia  Unstable  - Exercise for least 150 minutes/week  - Stable hydration  - Daily feet check  - Maintain a healthy diet, minimal carbohydrate, portion control   - Lipid Profile; Future    3. Long term (current) use of insulin (HCC)  On insulin for diabetes treatment      Disposition: Make an appointment to follow-up in 3 months  Do your blood work 2 weeks before next appointment          Thank you kindly for allowing me to participate in the diabetes care plan for this patient.    GAUTAM VelazcoRArianneNArianne  02/15/24      CC:   Brent Michelle D.N.P.

## 2024-02-17 DIAGNOSIS — E10.9 TYPE 1 DIABETES MELLITUS WITHOUT COMPLICATION (HCC): ICD-10-CM

## 2024-02-19 RX ORDER — INSULIN ASPART 100 [IU]/ML
INJECTION, SOLUTION INTRAVENOUS; SUBCUTANEOUS
Qty: 60 ML | Refills: 11 | Status: SHIPPED | OUTPATIENT
Start: 2024-02-19 | End: 2024-02-21

## 2024-02-21 DIAGNOSIS — E10.9 TYPE 1 DIABETES MELLITUS WITHOUT COMPLICATION (HCC): ICD-10-CM

## 2024-02-21 RX ORDER — INSULIN ASPART 100 [IU]/ML
INJECTION, SOLUTION INTRAVENOUS; SUBCUTANEOUS
Qty: 60 ML | Refills: 11 | Status: SHIPPED | OUTPATIENT
Start: 2024-02-21

## 2024-03-15 ENCOUNTER — HOSPITAL ENCOUNTER (OUTPATIENT)
Dept: RADIOLOGY | Facility: MEDICAL CENTER | Age: 24
End: 2024-03-15
Payer: COMMERCIAL

## 2024-03-15 DIAGNOSIS — R11.2 NAUSEA AND VOMITING, UNSPECIFIED VOMITING TYPE: ICD-10-CM

## 2024-03-15 PROCEDURE — A9541 TC99M SULFUR COLLOID: HCPCS

## 2024-04-01 ENCOUNTER — OFFICE VISIT (OUTPATIENT)
Dept: URGENT CARE | Facility: CLINIC | Age: 24
End: 2024-04-01
Payer: COMMERCIAL

## 2024-04-01 VITALS
OXYGEN SATURATION: 100 % | HEIGHT: 73 IN | SYSTOLIC BLOOD PRESSURE: 140 MMHG | BODY MASS INDEX: 23.54 KG/M2 | RESPIRATION RATE: 16 BRPM | TEMPERATURE: 98.1 F | HEART RATE: 114 BPM | DIASTOLIC BLOOD PRESSURE: 70 MMHG | WEIGHT: 177.6 LBS

## 2024-04-01 DIAGNOSIS — K64.8 INTERNAL HEMORRHOID: ICD-10-CM

## 2024-04-01 DIAGNOSIS — R00.2 PALPITATIONS: ICD-10-CM

## 2024-04-01 PROCEDURE — 3077F SYST BP >= 140 MM HG: CPT | Performed by: FAMILY MEDICINE

## 2024-04-01 PROCEDURE — 3078F DIAST BP <80 MM HG: CPT | Performed by: FAMILY MEDICINE

## 2024-04-01 PROCEDURE — 99214 OFFICE O/P EST MOD 30 MIN: CPT | Performed by: FAMILY MEDICINE

## 2024-04-01 ASSESSMENT — ENCOUNTER SYMPTOMS
FEVER: 0
DIZZINESS: 0
BLOOD IN STOOL: 1
PALPITATIONS: 1
NAUSEA: 0

## 2024-04-01 ASSESSMENT — FIBROSIS 4 INDEX: FIB4 SCORE: 0.6

## 2024-04-01 NOTE — PROGRESS NOTES
"Subjective:     Azam Myles is a 23 y.o. male who presents for Stool Color Change (Blood in stool, notice it just last night, this morning blood in stool ), Heart Problem (Heart racing), and Chest Pain    HPI  Pt presents for evaluation of 2 new problems  Patient just noticed blood in stool last night  No abd pain   No constipation or need to push while on toilet     Feels heart racing intermittently for the past few months   Had an episode a few days ago that lasted a few minutes   Has episodes a few times per week   No associated nausea or dizziness   Had some chest pain once, but has not recurred     Has some chronic abdominal issues and has been referred to GI for this   Seen in ER a month ago and had CT which was negative   Seen by GI and had gastroparesis evaluation which was \"inconclusive\"     Review of Systems   Constitutional:  Negative for fever.   Cardiovascular:  Positive for palpitations.   Gastrointestinal:  Positive for blood in stool. Negative for nausea.   Skin:  Negative for rash.   Neurological:  Negative for dizziness.       PMH:  has a past medical history of Pediatric asthma, mild intermittent, uncomplicated, Pediatric asthma, mild intermittent, uncomplicated (03/06/2022), and Syncope.  MEDS:   Current Outpatient Medications:     insulin aspart (NOVOLOG) 100 UNIT/ML Solution, UP TO 60 UNITS/DAY WITH INSULIN PUMP, Disp: 60 mL, Rfl: 11    promethazine (PHENERGAN) 25 MG Suppos, Insert 1 Suppository into the rectum every 6 hours as needed for Nausea/Vomiting., Disp: 10 Suppository, Rfl: 0    ondansetron (ZOFRAN ODT) 4 MG TABLET DISPERSIBLE, Take 1 Tablet by mouth every 6 hours as needed for Nausea/Vomiting., Disp: 10 Tablet, Rfl: 0    NOVOLOG 100 UNIT/ML Solution, Inject 55 Units under the skin 3 times a day before meals., Disp: 50 mL, Rfl: 3    Continuous Blood Gluc Transmit (DEXCOM G6 TRANSMITTER) Misc, 1 Applicator every 3 months., Disp: 2 Each, Rfl: 2    Continuous Blood Gluc " "Sensor (DEXCOM G6 SENSOR) Misc, 1 Applicator every 10 days., Disp: 9 Each, Rfl: 3    Alcohol Swabs, Wipe site with prep pad prior to injection., Disp: 100 Each, Rfl: 0  ALLERGIES: No Known Allergies  SURGHX: History reviewed. No pertinent surgical history.  SOCHX:  reports that he has never smoked. He has never used smokeless tobacco. He reports current drug use. Drugs: Inhaled and Marijuana. He reports that he does not drink alcohol.     Objective:   BP (!) 140/70 (BP Location: Left arm, Patient Position: Sitting, BP Cuff Size: Adult)   Pulse (!) 114   Temp 36.7 °C (98.1 °F) (Temporal)   Resp 16   Ht 1.854 m (6' 1\")   Wt 80.6 kg (177 lb 9.6 oz)   SpO2 100%   BMI 23.43 kg/m²     Physical Exam  Constitutional:       General: He is not in acute distress.     Appearance: He is well-developed. He is not diaphoretic.   Pulmonary:      Effort: Pulmonary effort is normal.   Abdominal:      General: Abdomen is flat. Bowel sounds are normal. There is no distension.      Palpations: Abdomen is soft.      Tenderness: There is no abdominal tenderness. There is no guarding or rebound.   Genitourinary:     Comments: No external hemorrhoid present, small internal hemorrhoid at the 3 o'clock position, glove grossly nonbloody  Neurological:      Mental Status: He is alert.         Assessment/Plan:   Assessment    1. Internal hemorrhoid    2. Palpitations  - REFERRAL TO CARDIOLOGY  - EKG - Clinic Performed    Patient with internal hemorrhoid.  There is no grossly bloody exam during rectal exam today.  Reviewed supportive care measures and suspect this will likely self resolve.  If it is not resolving, he does have follow-up with his GI physician.    Patient with palpitations intermittently.  Currently feels well and EKG in office currently is within normal limits.  Recommended follow-up with cardiology to consider possible Holter monitor.  Follow-up in urgent care as needed.    "

## 2024-04-03 ENCOUNTER — APPOINTMENT (OUTPATIENT)
Dept: MEDICAL GROUP | Facility: PHYSICIAN GROUP | Age: 24
End: 2024-04-03
Payer: COMMERCIAL

## 2024-04-03 VITALS
BODY MASS INDEX: 23.67 KG/M2 | HEIGHT: 73 IN | DIASTOLIC BLOOD PRESSURE: 70 MMHG | TEMPERATURE: 97.9 F | SYSTOLIC BLOOD PRESSURE: 140 MMHG | WEIGHT: 178.6 LBS | HEART RATE: 82 BPM | OXYGEN SATURATION: 97 %

## 2024-04-03 DIAGNOSIS — I10 PRIMARY HYPERTENSION: ICD-10-CM

## 2024-04-03 DIAGNOSIS — R10.84 GENERALIZED ABDOMINAL PAIN: ICD-10-CM

## 2024-04-03 DIAGNOSIS — R00.2 PALPITATIONS: ICD-10-CM

## 2024-04-03 DIAGNOSIS — R00.0 RAPID HEART RATE: ICD-10-CM

## 2024-04-03 DIAGNOSIS — E10.9 TYPE 1 DIABETES MELLITUS WITHOUT COMPLICATION (HCC): ICD-10-CM

## 2024-04-03 PROCEDURE — 3078F DIAST BP <80 MM HG: CPT

## 2024-04-03 PROCEDURE — 3077F SYST BP >= 140 MM HG: CPT

## 2024-04-03 PROCEDURE — 99214 OFFICE O/P EST MOD 30 MIN: CPT

## 2024-04-03 RX ORDER — LOSARTAN POTASSIUM 25 MG/1
25 TABLET ORAL DAILY
Qty: 90 TABLET | Refills: 3 | Status: SHIPPED | OUTPATIENT
Start: 2024-04-03

## 2024-04-03 RX ORDER — FAMOTIDINE 40 MG/1
40 TABLET, FILM COATED ORAL
COMMUNITY
Start: 2024-02-12

## 2024-04-03 ASSESSMENT — FIBROSIS 4 INDEX: FIB4 SCORE: 0.6

## 2024-04-03 ASSESSMENT — ENCOUNTER SYMPTOMS
MYALGIAS: 0
FEVER: 0
DIZZINESS: 0
NAUSEA: 0
CONSTIPATION: 0
VOMITING: 0
WEIGHT LOSS: 0
WEAKNESS: 0
HEADACHES: 0
DIARRHEA: 0
ABDOMINAL PAIN: 1
PALPITATIONS: 1
CHILLS: 0
BLURRED VISION: 0
COUGH: 0
SHORTNESS OF BREATH: 0

## 2024-04-03 ASSESSMENT — PATIENT HEALTH QUESTIONNAIRE - PHQ9: CLINICAL INTERPRETATION OF PHQ2 SCORE: 0

## 2024-04-03 NOTE — PROGRESS NOTES
Verbal consent was acquired by the patient to use [employer] ambient listening note generation during this visit  Subjective:     CC: palpitations    HPI:   Azam presents today with  History of Present Illness  The patient presents for evaluation of multiple medical concerns.    The patient has been experiencing intermittent episodes of tachycardia, with a peak heart rate in the 130s, as measured by  Apple watch. These episodes, which occurred a few days prior to urgent care visit on Monday, coincided with the onset of her gastrointestinal issues. During urgent care visit, heart rate was recorded at 114, a marked on  EKG, and  blood pressure was recorded at 140/70. He speculates that symptoms may be indicative of panic attacks, as he has a history of diabetes, which she believes may be contributing to recurrent symptoms. he also reports experiencing chest pain on one occasion. The palpitations, which he describes as a pounding sensation, occur sporadically, even during periods of inactivity and vary in duration from 5 to 10 minutes. he recalls using a heart monitor for a week at the age of 10 due to episodes of near syncope, but currently denies experiencing such symptoms. he also reports experiencing anxiety, which he believes may be contributing to her elevated heart rate.    The patient has a history of hypertension since childhood, with readings in the 130s over 70s. She does not own a home blood pressure monitor.    The patient has a history of gastroparesis, with a recent gastric emptying examination returning normal results. he visited the ER in 02/2024 for a gastrointestinal issue, but without any significant findings. Following a subsequent flare-up, he was unable to eat for 5 days on two separate occasions in 02/2024, primarily after consuming pizza. Despite a switch to a gluten-free diet, he has not experienced any further issues. he expresses interest in undergoing a food sensitivity test. Despite  "taking famotidine a few times, he has not noticed any difference in symptoms. he occasionally experiences postprandial abdominal pain, bloating, and belching. he has been taking fiber supplements. he visited the urgent care on Monday due to blood in  stool, which was diagnosed as a small hemorrhoid. he expresses concern about her colon, as her grandmother had multiple polyps, necessitating a colectomy. he is uncertain if this could be contributing to gastrointestinal issues. he was advised to adhere to a gluten-free diet for 3 weeks prior to her blood work. he reports that her blood sugar levels would stabilize after eating, but they would spike 2 hours post-gluten consumption.   has a family history of gluten issues.      Problem   Rapid Heart Rate         ROS:  Review of Systems   Constitutional:  Negative for chills, fever, malaise/fatigue and weight loss.   Eyes:  Negative for blurred vision.   Respiratory:  Negative for cough and shortness of breath.    Cardiovascular:  Positive for palpitations. Negative for chest pain.   Gastrointestinal:  Positive for abdominal pain. Negative for constipation, diarrhea, nausea and vomiting.   Musculoskeletal:  Negative for myalgias.   Neurological:  Negative for dizziness, weakness and headaches.       Objective:     Exam:  BP (!) 140/70 (BP Location: Left arm, Patient Position: Sitting, BP Cuff Size: Adult)   Pulse 82   Temp 36.6 °C (97.9 °F) (Temporal)   Ht 1.854 m (6' 1\")   Wt 81 kg (178 lb 9.6 oz)   SpO2 97%   BMI 23.56 kg/m²  Body mass index is 23.56 kg/m².    Physical Exam  Constitutional:       Appearance: Normal appearance.   HENT:      Head: Normocephalic.   Eyes:      Conjunctiva/sclera: Conjunctivae normal.      Pupils: Pupils are equal, round, and reactive to light.   Cardiovascular:      Rate and Rhythm: Normal rate and regular rhythm.      Heart sounds: No murmur heard.  Pulmonary:      Effort: Pulmonary effort is normal. No respiratory distress.      " Breath sounds: Normal breath sounds.   Musculoskeletal:         General: Normal range of motion.   Skin:     General: Skin is warm and dry.   Neurological:      General: No focal deficit present.      Mental Status: He is alert and oriented to person, place, and time.   Psychiatric:         Mood and Affect: Mood normal.         Behavior: Behavior normal.         Labs:   No visits with results within 1 Month(s) from this visit.   Latest known visit with results is:   Admission on 02/10/2024, Discharged on 02/10/2024   Component Date Value    Lactic Acid 02/10/2024 1.4     WBC 02/10/2024 11.8 (H)     RBC 02/10/2024 5.16     Hemoglobin 02/10/2024 16.1     Hematocrit 02/10/2024 47.1     MCV 02/10/2024 91.3     MCH 02/10/2024 31.2     MCHC 02/10/2024 34.2     RDW 02/10/2024 41.0     Platelet Count 02/10/2024 287     MPV 02/10/2024 9.9     Neutrophils-Polys 02/10/2024 81.80 (H)     Lymphocytes 02/10/2024 11.90 (L)     Monocytes 02/10/2024 5.50     Eosinophils 02/10/2024 0.20     Basophils 02/10/2024 0.30     Immature Granulocytes 02/10/2024 0.30     Nucleated RBC 02/10/2024 0.00     Neutrophils (Absolute) 02/10/2024 9.69 (H)     Lymphs (Absolute) 02/10/2024 1.41     Monos (Absolute) 02/10/2024 0.65     Eos (Absolute) 02/10/2024 0.02     Baso (Absolute) 02/10/2024 0.04     Immature Granulocytes (a* 02/10/2024 0.03     NRBC (Absolute) 02/10/2024 0.00     Sodium 02/10/2024 135     Potassium 02/10/2024 3.8     Chloride 02/10/2024 97     Co2 02/10/2024 25     Anion Gap 02/10/2024 13.0     Glucose 02/10/2024 158 (H)     Bun 02/10/2024 11     Creatinine 02/10/2024 1.07     Calcium 02/10/2024 9.6     Correct Calcium 02/10/2024 8.7     AST(SGOT) 02/10/2024 36     ALT(SGPT) 02/10/2024 19     Alkaline Phosphatase 02/10/2024 85     Total Bilirubin 02/10/2024 0.8     Albumin 02/10/2024 5.1 (H)     Total Protein 02/10/2024 7.9     Globulin 02/10/2024 2.8     A-G Ratio 02/10/2024 1.8     Color 02/10/2024 Yellow     Character 02/10/2024  Clear     Specific Gravity 02/10/2024 <=1.005     Ph 02/10/2024 6.5     Glucose 02/10/2024 Negative     Ketones 02/10/2024 Negative     Protein 02/10/2024 Negative     Bilirubin 02/10/2024 Negative     Nitrite 02/10/2024 Negative     Leukocyte Esterase 02/10/2024 Negative     Occult Blood 02/10/2024 Negative     Micro Urine Req 02/10/2024 see below     Significant Indicator 02/10/2024 NEG     Source 02/10/2024 UR     Site 02/10/2024 URINE, CLEAN CATCH     Culture Result 02/10/2024 Usual urogenital yu 10-50,000 cfu/mL     Significant Indicator 02/10/2024 NEG     Source 02/10/2024 BLD     Site 02/10/2024 PERIPHERAL     Culture Result 02/10/2024                      Value:No growth after 5 days of incubation.  Blood culture testing and Gram stain, if indicated, are  performed at [facility], [address] [place] [address].  Positive blood cultures are  sent to [facility], [address] [place] [address], for organism identification and  susceptibility testing.      Significant Indicator 02/10/2024 NEG     Source 02/10/2024 BLD     Site 02/10/2024 LINE     Culture Result 02/10/2024                      Value:No growth after 5 days of incubation.  Blood culture testing and Gram stain, if indicated, are  performed at [facility], [address] [place] [address].  Positive blood cultures are  sent to [facility], [address] [place] [address], for organism identification and  susceptibility testing.      Report 02/10/2024                      Value:[facility].    Test Date:  2024-02-10  Pt Name:    AILEEN BONILLA               Department: [facility]  MRN:        [id number]                      [facility]  Gender:     Male                         Technician: [id number]  :        2000                   Requested By:ANN OABNDO  Order #:    [id number]                    Reading  MD: Devan Hernandez MD    Measurements  Intervals                                Axis  Rate:       90                           P:          13  DC:         140                          QRS:        67  QRSD:       98                           T:          17  QT:         332  QTc:        407    Interpretive Statements  Sinus rhythm  RSR' in V1 or V2, right VCD or RVH  Probable left ventricular hypertrophy  No previous ECG available for comparison  Electronically Signed On 02- 16:02:58 PST by Devan Hernandez MD      GFR (CKD-EPI) 02/10/2024 101     Lipase 02/10/2024 9 (L)          Assessment & Plan: Medical Decision Making     23 y.o. male with the following -   Assessment & Plan  1. Tachycardia and palpitations.  The patient's electrocardiogram (EKG) results were normal, and  blood pressure is slightly elevated. A ZIO patch will be utilized for 2 weeks, along with a heart monitor. A low dose of losartan will be prescribed for renal protection, particularly in light of  diabetes and hypertension.    2. Diabetes.  The patient will be referred back to [facility] at the [facility].    3. Gluten intolerance.  The patient's symptoms may be attributed to postprandial or nocturnal blood glucose levels. The patient will be tested for celiac disease. She will adhere to a gluten diet for 3 weeks. She will gradually reintroduce gluten and dairy products into her diet. A daily probiotic regimen is recommended.    Follow-up  The patient is scheduled for a follow-up visit in 1 to 2 months.    Problem List Items Addressed This Visit       Diabetes (HCC)    Relevant Medications    losartan (COZAAR) 25 MG Tab    Other Relevant Orders    Referral to Endocrinology    Rapid heart rate    Relevant Orders    RIH ZIO PATCH MONITOR     Other Visit Diagnoses       Palpitations        Relevant Orders    RI ZIO PATCH MONITOR    Primary hypertension        Relevant Medications    losartan (COZAAR) 25 MG Tab     Generalized abdominal pain        Relevant Orders    CELIAC DISEASE AB SCREEN W/RFX            Differential diagnosis, natural history, supportive care, and indications for immediate follow-up discussed.  Shared decision making approach utilized, and patient is amendable with plan of care.  Patient understands to return to clinic or go to the emergency department if symptoms worsen. All questions and concerns addressed to the best of my knowledge.    Return in about 6 months (around 10/3/2024), or if symptoms worsen or fail to improve.    Please note that this dictation was created using voice recognition software. I have made every reasonable attempt to correct obvious errors, but I expect that there are errors of grammar and possibly content that I did not discover before finalizing the note.

## 2024-04-17 ENCOUNTER — NON-PROVIDER VISIT (OUTPATIENT)
Dept: CARDIOLOGY | Facility: MEDICAL CENTER | Age: 24
End: 2024-04-17
Payer: COMMERCIAL

## 2024-04-17 DIAGNOSIS — R00.2 PALPITATIONS: ICD-10-CM

## 2024-04-17 DIAGNOSIS — R00.0 RAPID HEART RATE: ICD-10-CM

## 2024-04-17 PROCEDURE — 93246 EXT ECG>7D<15D RECORDING: CPT | Performed by: INTERNAL MEDICINE

## 2024-04-17 NOTE — PROGRESS NOTES
Patient enrolled in 14 day Zio Patch program per Brent Michelle .  In clinic hookup.  >Currently pending EOS.  Monitor serial number: [id number]

## 2024-05-06 ENCOUNTER — APPOINTMENT (OUTPATIENT)
Dept: MEDICAL GROUP | Facility: PHYSICIAN GROUP | Age: 24
End: 2024-05-06
Payer: COMMERCIAL

## 2024-05-08 ENCOUNTER — TELEPHONE (OUTPATIENT)
Dept: CARDIOLOGY | Facility: MEDICAL CENTER | Age: 24
End: 2024-05-08
Payer: COMMERCIAL

## 2024-05-08 NOTE — TELEPHONE ENCOUNTER
PEARL EOS to  for review on 5/9/24 as ADD  Monitor noted:  1 episode of SVT, 111-156 with an avg rate of 139 BPM  Sinus  with an avg rate of 95 BPM  Junctional rhythm present  Rita present  Rare ectopics  23 patient events corresponding with:  Sinus   Ectopic Atrial Rhythm

## 2024-06-13 ENCOUNTER — APPOINTMENT (OUTPATIENT)
Dept: CARDIOLOGY | Facility: MEDICAL CENTER | Age: 24
End: 2024-06-13
Attending: FAMILY MEDICINE
Payer: COMMERCIAL

## 2024-06-24 ENCOUNTER — OFFICE VISIT (OUTPATIENT)
Dept: CARDIOLOGY | Facility: MEDICAL CENTER | Age: 24
End: 2024-06-24
Attending: FAMILY MEDICINE
Payer: COMMERCIAL

## 2024-06-24 VITALS
WEIGHT: 185 LBS | DIASTOLIC BLOOD PRESSURE: 70 MMHG | HEIGHT: 73 IN | HEART RATE: 86 BPM | BODY MASS INDEX: 24.52 KG/M2 | RESPIRATION RATE: 16 BRPM | OXYGEN SATURATION: 97 % | SYSTOLIC BLOOD PRESSURE: 110 MMHG

## 2024-06-24 DIAGNOSIS — I10 ESSENTIAL HYPERTENSION, BENIGN: ICD-10-CM

## 2024-06-24 DIAGNOSIS — E78.5 DYSLIPIDEMIA: ICD-10-CM

## 2024-06-24 DIAGNOSIS — R00.2 PALPITATIONS: ICD-10-CM

## 2024-06-24 DIAGNOSIS — I47.19 ATRIAL TACHYCARDIA (HCC): ICD-10-CM

## 2024-06-24 PROCEDURE — 3078F DIAST BP <80 MM HG: CPT | Performed by: INTERNAL MEDICINE

## 2024-06-24 PROCEDURE — 3074F SYST BP LT 130 MM HG: CPT | Performed by: INTERNAL MEDICINE

## 2024-06-24 PROCEDURE — 99212 OFFICE O/P EST SF 10 MIN: CPT | Performed by: INTERNAL MEDICINE

## 2024-06-24 PROCEDURE — 99204 OFFICE O/P NEW MOD 45 MIN: CPT | Performed by: INTERNAL MEDICINE

## 2024-06-24 ASSESSMENT — ENCOUNTER SYMPTOMS
DEPRESSION: 0
NERVOUS/ANXIOUS: 0
ABDOMINAL PAIN: 0
DIZZINESS: 0
WEIGHT LOSS: 0
MUSCULOSKELETAL NEGATIVE: 1
PSYCHIATRIC NEGATIVE: 1
FOCAL WEAKNESS: 0
NEUROLOGICAL NEGATIVE: 1
BRUISES/BLEEDS EASILY: 0
VOMITING: 0
MYALGIAS: 0
DOUBLE VISION: 0
EYES NEGATIVE: 1
NAUSEA: 0
RESPIRATORY NEGATIVE: 1
GASTROINTESTINAL NEGATIVE: 1
FEVER: 0
COUGH: 0
SHORTNESS OF BREATH: 0
CONSTITUTIONAL NEGATIVE: 1
CLAUDICATION: 0
PALPITATIONS: 1
WEAKNESS: 0
CHILLS: 0
HEADACHES: 0
BLURRED VISION: 0

## 2024-06-24 ASSESSMENT — FIBROSIS 4 INDEX: FIB4 SCORE: 0.6

## 2024-06-24 NOTE — PROGRESS NOTES
Chief Complaint   Patient presents with    New Patient     NP Dx: Palpitations    Dyslipidemia    Syncope     NP Dx: Syncope and collapse         Subjective     Azam Ja Myles is a 23 y.o. male who presents today as a consult from Dk Villatoro for palpitations.    Thank you for allowing me to evaluate Mr. Myles, who as you know is a 25 year old male with diabetes mellitus, hypertension, marijuana use, no family history of coronary artery disease. He was recently evaluated at [facility] on 04/01/24 for palpitations. He describes his palpitations to be moderate palpitation sensations, lasting 10 to 30 minutes. He denies associated chest pain, shortness of breath, diaphoresis, nausea and vomiting. His palpitations are aggravated by emotional stress. He denies alleviating factors. His symptoms has improved since reducing gluten intake. He underwent Zio monitor which demonstrated atrial tachycardia. He does not exercise regularly. He works for [employer] in [place].     Past Medical History:   Diagnosis Date    Depression     Hypertension     Pediatric asthma, mild intermittent, uncomplicated     Pediatric asthma, mild intermittent, uncomplicated 03/06/2022    Syncope     childhood     Past Surgical History:   Procedure Laterality Date    NO PERTINENT PAST SURGICAL HISTORY       Family History   Problem Relation Age of Onset    No Known Problems Mother     No Known Problems Father     No Known Problems Sister     Diabetes Maternal Grandmother     Heart Attack Neg Hx     Heart Disease Neg Hx      Social History     Socioeconomic History    Marital status: Single     Spouse name: Not on file    Number of children: Not on file    Years of education: Not on file    Highest education level: Not on file   Occupational History    Not on file   Tobacco Use    Smoking status: Never    Smokeless tobacco: Never   Vaping Use    Vaping status: Never Used   Substance and Sexual Activity     Alcohol use: Not Currently     Comment: rarely    Drug use: Yes     Types: Inhaled, Marijuana    Sexual activity: Yes     Partners: Female     Birth control/protection: Condom   Other Topics Concern    Not on file   Social History Narrative    Not on file     Social Determinants of Health     Financial Resource Strain: Not on file   Food Insecurity: Not on file   Transportation Needs: Not on file   Physical Activity: Not on file   Stress: Not on file   Social Connections: Not on file   Intimate Partner Violence: Not on file   Housing Stability: Not on file     No Known Allergies  (Medications reviewed.)   Outpatient Encounter Medications as of 6/24/2024   Medication Sig Dispense Refill    losartan (COZAAR) 25 MG Tab Take 1 Tablet by mouth every day. 90 Tablet 3    insulin aspart (NOVOLOG) 100 UNIT/ML Solution UP TO 60 UNITS/DAY WITH INSULIN PUMP 60 mL 11    NOVOLOG 100 UNIT/ML Solution Inject 55 Units under the skin 3 times a day before meals. 50 mL 3    Continuous Blood Gluc Transmit (DEXCOM G6 TRANSMITTER) Misc 1 Applicator every 3 months. 2 Each 2    Continuous Blood Gluc Sensor (DEXCOM G6 SENSOR) Misc 1 Applicator every 10 days. 9 Each 3    Alcohol Swabs Wipe site with prep pad prior to injection. 100 Each 0    famotidine (PEPCID) 40 MG Tab Take 40 mg by mouth every day. (Patient not taking: Reported on 6/24/2024)      promethazine (PHENERGAN) 25 MG Suppos Insert 1 Suppository into the rectum every 6 hours as needed for Nausea/Vomiting. (Patient not taking: Reported on 6/24/2024) 10 Suppository 0    ondansetron (ZOFRAN ODT) 4 MG TABLET DISPERSIBLE Take 1 Tablet by mouth every 6 hours as needed for Nausea/Vomiting. (Patient not taking: Reported on 6/24/2024) 10 Tablet 0     No facility-administered encounter medications on file as of 6/24/2024.     Review of Systems   Constitutional: Negative.  Negative for chills, fever, malaise/fatigue and weight loss.   HENT: Negative.  Negative for hearing loss.    Eyes:  "Negative.  Negative for blurred vision and double vision.   Respiratory: Negative.  Negative for cough and shortness of breath.    Cardiovascular:  Positive for palpitations. Negative for chest pain, claudication and leg swelling.   Gastrointestinal: Negative.  Negative for abdominal pain, nausea and vomiting.   Genitourinary: Negative.  Negative for dysuria and urgency.   Musculoskeletal: Negative.  Negative for joint pain and myalgias.   Skin: Negative.  Negative for itching and rash.   Neurological: Negative.  Negative for dizziness, focal weakness, weakness and headaches.   Endo/Heme/Allergies: Negative.  Does not bruise/bleed easily.   Psychiatric/Behavioral: Negative.  Negative for depression. The patient is not nervous/anxious.               Objective     /70 (BP Location: Right arm, Patient Position: Sitting, BP Cuff Size: Adult)   Pulse 86   Resp 16   Ht 1.854 m (6' 1\")   Wt 83.9 kg (185 lb)   SpO2 97%   BMI 24.41 kg/m²     Physical Exam  Constitutional:       Appearance: Normal appearance. He is well-developed and normal weight.   HENT:      Head: Normocephalic and atraumatic.   Neck:      Vascular: No JVD.   Cardiovascular:      Rate and Rhythm: Normal rate and regular rhythm.      Heart sounds: Normal heart sounds.   Pulmonary:      Effort: Pulmonary effort is normal.      Breath sounds: Normal breath sounds.   Abdominal:      General: Bowel sounds are normal.      Palpations: Abdomen is soft.      Comments: No hepatosplenomegaly.   Musculoskeletal:         General: Normal range of motion.   Lymphadenopathy:      Cervical: No cervical adenopathy.   Skin:     General: Skin is warm and dry.   Neurological:      Mental Status: He is alert and oriented to person, place, and time.            CARDIAC STUDIES/PROCEDURES:    ECHOCARDIOGRAM CONCLUSIONS at [facility] (01/11/21)  1) EF 55% with normal wall motion   2) Trileaflet aortic valve with trace regurg   3) No effusions   4) Normal CVP   (study " result reviewed)     EKG performed on (24) was reviewed: EKG personally interpreted shows sinus rhythm with RSR' of lead V1 and V2.     Laboratory results of (22) were reviewed. Cholesterol profile of 206/61/57/137 mg/dL noted.     Procedures: Zio  Dates of monitorin2024 - 2024  Duration: 13 days 20 hours  Findings:  1.  Predominant rhythm was sinus rhythm with an average heart rate of 95 bpm.  2.  There were no episodes of ventricular tachycardia.  3.  There were no episodes of atrial fibrillation.  4.  No significant pauses or high-grade AV block.  5.  Second-degree AV block Mobitz type I was present during the monitoring period (event occurred on 2024 at 9 AM)  6.  1 run of sustained supraventricular tachycardia occurred that lasted 54 seconds in duration.  An ectopic atrial tachycardia was also present.  7.  Rare PACs and rare PVCs.  8.  Patient triggered events correlated with sinus rhythm as well as SVT/ectopic atrial tachycardia.    Assessment & Plan     1. Palpitations        2. Atrial tachycardia (HCC)            Medical Decision Making: Today's Assessment/Status/Plan:        Palpitations: He is is a 25 year old male with diabetes mellitus, hypertension, marijuana use, no family history of coronary artery disease. He was recently evaluated at [facility] or palpitations. He underwent Zio monitor which demonstrated atrial tachycardia. We discussed options of starting beta blockade therapy, however, he does not wish to start any additional mediations at this time. He will work on stress reduction, increasing aerobic exercise, avoidence of marijuana and obtaining adequate amount of sleep. We will reevaluate his symptoms after these have been achieved.   Hypertension: Blood pressure is well controlled. We will continue with losartan.  Hyperlipidemia: Currently not well controlled on strict diet and exercise therapy. We will repeat labs including fasting lipid  profile.     We will follow up in 3 months.    Thank you for this consult.     CC Brent Naik

## 2024-07-20 ENCOUNTER — HOSPITAL ENCOUNTER (OUTPATIENT)
Dept: LAB | Facility: MEDICAL CENTER | Age: 24
End: 2024-07-20
Payer: COMMERCIAL

## 2024-07-20 DIAGNOSIS — E78.00 ELEVATED LDL CHOLESTEROL LEVEL: ICD-10-CM

## 2024-07-20 DIAGNOSIS — E10.9 TYPE 1 DIABETES MELLITUS WITHOUT COMPLICATION (HCC): ICD-10-CM

## 2024-07-20 DIAGNOSIS — E78.49 OTHER HYPERLIPIDEMIA: ICD-10-CM

## 2024-07-20 DIAGNOSIS — Z11.59 NEED FOR HEPATITIS C SCREENING TEST: ICD-10-CM

## 2024-07-20 LAB
25(OH)D3 SERPL-MCNC: 19 NG/ML (ref 30–100)
ALBUMIN SERPL BCP-MCNC: 4.8 G/DL (ref 3.2–4.9)
ALBUMIN SERPL BCP-MCNC: 4.9 G/DL (ref 3.2–4.9)
ALBUMIN/GLOB SERPL: 2 G/DL
ALBUMIN/GLOB SERPL: 2.2 G/DL
ALP SERPL-CCNC: 80 U/L (ref 30–99)
ALP SERPL-CCNC: 81 U/L (ref 30–99)
ALT SERPL-CCNC: 23 U/L (ref 2–50)
ALT SERPL-CCNC: 24 U/L (ref 2–50)
ANION GAP SERPL CALC-SCNC: 11 MMOL/L (ref 7–16)
ANION GAP SERPL CALC-SCNC: 11 MMOL/L (ref 7–16)
AST SERPL-CCNC: 19 U/L (ref 12–45)
AST SERPL-CCNC: 20 U/L (ref 12–45)
BILIRUB SERPL-MCNC: 0.7 MG/DL (ref 0.1–1.5)
BILIRUB SERPL-MCNC: 0.7 MG/DL (ref 0.1–1.5)
BUN SERPL-MCNC: 9 MG/DL (ref 8–22)
BUN SERPL-MCNC: 9 MG/DL (ref 8–22)
CALCIUM ALBUM COR SERPL-MCNC: 9.4 MG/DL (ref 8.5–10.5)
CALCIUM ALBUM COR SERPL-MCNC: 9.5 MG/DL (ref 8.5–10.5)
CALCIUM SERPL-MCNC: 10.1 MG/DL (ref 8.5–10.5)
CALCIUM SERPL-MCNC: 10.1 MG/DL (ref 8.5–10.5)
CHLORIDE SERPL-SCNC: 102 MMOL/L (ref 96–112)
CHLORIDE SERPL-SCNC: 103 MMOL/L (ref 96–112)
CHOLEST SERPL-MCNC: 168 MG/DL (ref 100–199)
CHOLEST SERPL-MCNC: 173 MG/DL (ref 100–199)
CO2 SERPL-SCNC: 26 MMOL/L (ref 20–33)
CO2 SERPL-SCNC: 27 MMOL/L (ref 20–33)
CREAT SERPL-MCNC: 1.02 MG/DL (ref 0.5–1.4)
CREAT SERPL-MCNC: 1.05 MG/DL (ref 0.5–1.4)
CREAT UR-MCNC: 127.23 MG/DL
EST. AVERAGE GLUCOSE BLD GHB EST-MCNC: 126 MG/DL
FASTING STATUS PATIENT QL REPORTED: NORMAL
FASTING STATUS PATIENT QL REPORTED: NORMAL
GFR SERPLBLD CREATININE-BSD FMLA CKD-EPI: 103 ML/MIN/1.73 M 2
GFR SERPLBLD CREATININE-BSD FMLA CKD-EPI: 107 ML/MIN/1.73 M 2
GLOBULIN SER CALC-MCNC: 2.2 G/DL (ref 1.9–3.5)
GLOBULIN SER CALC-MCNC: 2.4 G/DL (ref 1.9–3.5)
GLUCOSE SERPL-MCNC: 125 MG/DL (ref 65–99)
GLUCOSE SERPL-MCNC: 126 MG/DL (ref 65–99)
HBA1C MFR BLD: 6 % (ref 4–5.6)
HCV AB SER QL: NORMAL
HDLC SERPL-MCNC: 57 MG/DL
HDLC SERPL-MCNC: 58 MG/DL
LDLC SERPL CALC-MCNC: 103 MG/DL
LDLC SERPL CALC-MCNC: 109 MG/DL
MICROALBUMIN UR-MCNC: <1.2 MG/DL
MICROALBUMIN/CREAT UR: NORMAL MG/G (ref 0–30)
POTASSIUM SERPL-SCNC: 4.4 MMOL/L (ref 3.6–5.5)
POTASSIUM SERPL-SCNC: 4.4 MMOL/L (ref 3.6–5.5)
PROT SERPL-MCNC: 7.1 G/DL (ref 6–8.2)
PROT SERPL-MCNC: 7.2 G/DL (ref 6–8.2)
SODIUM SERPL-SCNC: 140 MMOL/L (ref 135–145)
SODIUM SERPL-SCNC: 140 MMOL/L (ref 135–145)
T4 FREE SERPL-MCNC: 1.38 NG/DL (ref 0.93–1.7)
TRIGL SERPL-MCNC: 36 MG/DL (ref 0–149)
TRIGL SERPL-MCNC: 36 MG/DL (ref 0–149)
TSH SERPL-ACNC: 0.82 UIU/ML (ref 0.35–5.5)

## 2024-07-20 PROCEDURE — 83036 HEMOGLOBIN GLYCOSYLATED A1C: CPT

## 2024-07-20 PROCEDURE — 82043 UR ALBUMIN QUANTITATIVE: CPT

## 2024-07-20 PROCEDURE — 80061 LIPID PANEL: CPT

## 2024-07-20 PROCEDURE — 80061 LIPID PANEL: CPT | Mod: 91

## 2024-07-20 PROCEDURE — 86803 HEPATITIS C AB TEST: CPT

## 2024-07-20 PROCEDURE — 86364 TISS TRNSGLTMNASE EA IG CLAS: CPT

## 2024-07-20 PROCEDURE — 80053 COMPREHEN METABOLIC PANEL: CPT | Mod: 91

## 2024-07-20 PROCEDURE — 82306 VITAMIN D 25 HYDROXY: CPT

## 2024-07-20 PROCEDURE — 36415 COLL VENOUS BLD VENIPUNCTURE: CPT

## 2024-07-20 PROCEDURE — 84443 ASSAY THYROID STIM HORMONE: CPT

## 2024-07-20 PROCEDURE — 82570 ASSAY OF URINE CREATININE: CPT

## 2024-07-20 PROCEDURE — 80053 COMPREHEN METABOLIC PANEL: CPT

## 2024-07-20 PROCEDURE — 84439 ASSAY OF FREE THYROXINE: CPT

## 2024-07-24 LAB — TTG IGA SER IA-ACNC: <1.02 FLU (ref 0–4.99)

## 2024-08-23 LAB
GLIADIN IGA SER IA-ACNC: <0.72 FLU (ref 0–4.99)
GLIADIN IGG SER IA-ACNC: <0.56 FLU (ref 0–4.99)
TTG IGG SER IA-ACNC: <0.82 FLU (ref 0–4.99)

## 2024-10-24 ENCOUNTER — APPOINTMENT (OUTPATIENT)
Dept: CARDIOLOGY | Facility: MEDICAL CENTER | Age: 24
End: 2024-10-24
Attending: INTERNAL MEDICINE
Payer: COMMERCIAL

## 2024-10-24 DIAGNOSIS — E10.9 TYPE 1 DIABETES MELLITUS WITHOUT COMPLICATION (HCC): ICD-10-CM

## 2024-10-24 RX ORDER — INSULIN ASPART 100 [IU]/ML
33.56 INJECTION, SOLUTION INTRAVENOUS; SUBCUTANEOUS
Qty: 50 ML | Refills: 3 | Status: SHIPPED | OUTPATIENT
Start: 2024-10-24

## 2024-10-28 DIAGNOSIS — E10.9 TYPE 1 DIABETES MELLITUS WITHOUT COMPLICATION (HCC): ICD-10-CM

## 2024-11-05 DIAGNOSIS — E10.8 CONTROLLED DIABETES MELLITUS TYPE 1 WITH COMPLICATIONS (HCC): ICD-10-CM

## 2024-11-05 DIAGNOSIS — E10.9 TYPE 1 DIABETES MELLITUS WITHOUT COMPLICATION (HCC): ICD-10-CM

## 2024-11-05 RX ORDER — INSULIN ASPART 100 [IU]/ML
33 INJECTION, SOLUTION INTRAVENOUS; SUBCUTANEOUS
Qty: 30 ML | Refills: 1 | Status: SHIPPED | OUTPATIENT
Start: 2024-11-05 | End: 2024-11-05

## 2024-11-05 RX ORDER — INSULIN LISPRO 100 [IU]/ML
10 INJECTION, SOLUTION INTRAVENOUS; SUBCUTANEOUS
Qty: 10 ML | Refills: 1 | Status: SHIPPED | OUTPATIENT
Start: 2024-11-05

## 2024-11-15 ENCOUNTER — HOSPITAL ENCOUNTER (OUTPATIENT)
Dept: RADIOLOGY | Facility: MEDICAL CENTER | Age: 24
End: 2024-11-15
Payer: COMMERCIAL

## 2024-11-15 DIAGNOSIS — R11.2 NAUSEA, VOMITING AND DIARRHEA: ICD-10-CM

## 2024-11-15 DIAGNOSIS — R19.7 NAUSEA, VOMITING AND DIARRHEA: ICD-10-CM

## 2024-11-15 PROCEDURE — 74248 X-RAY SM INT F-THRU STD: CPT

## 2025-01-13 DIAGNOSIS — E10.65 TYPE 1 DIABETES MELLITUS WITH HYPERGLYCEMIA (HCC): ICD-10-CM

## 2025-01-13 RX ORDER — INSULIN LISPRO 100 [IU]/ML
5 INJECTION, SOLUTION INTRAVENOUS; SUBCUTANEOUS
Qty: 10 ML | Refills: 1 | Status: SHIPPED | OUTPATIENT
Start: 2025-01-13 | End: 2025-01-23

## 2025-01-23 DIAGNOSIS — E10.65 TYPE 1 DIABETES MELLITUS WITH HYPERGLYCEMIA (HCC): ICD-10-CM

## 2025-01-23 RX ORDER — INSULIN LISPRO 100 [IU]/ML
6 INJECTION, SOLUTION INTRAVENOUS; SUBCUTANEOUS
Qty: 3 ML | Refills: 3 | Status: SHIPPED | OUTPATIENT
Start: 2025-01-23

## 2025-04-11 ENCOUNTER — OFFICE VISIT (OUTPATIENT)
Dept: ENDOCRINOLOGY | Facility: MEDICAL CENTER | Age: 25
End: 2025-04-11
Attending: STUDENT IN AN ORGANIZED HEALTH CARE EDUCATION/TRAINING PROGRAM
Payer: COMMERCIAL

## 2025-04-11 VITALS
HEART RATE: 97 BPM | DIASTOLIC BLOOD PRESSURE: 74 MMHG | WEIGHT: 194.6 LBS | BODY MASS INDEX: 25.79 KG/M2 | SYSTOLIC BLOOD PRESSURE: 118 MMHG | OXYGEN SATURATION: 99 % | HEIGHT: 73 IN

## 2025-04-11 DIAGNOSIS — E10.9 TYPE 1 DIABETES MELLITUS WITHOUT COMPLICATION (HCC): ICD-10-CM

## 2025-04-11 LAB
HBA1C MFR BLD: 6.2 % (ref ?–5.8)
POCT INT CON NEG: NEGATIVE
POCT INT CON POS: POSITIVE

## 2025-04-11 PROCEDURE — 3078F DIAST BP <80 MM HG: CPT | Performed by: STUDENT IN AN ORGANIZED HEALTH CARE EDUCATION/TRAINING PROGRAM

## 2025-04-11 PROCEDURE — 95250 CONT GLUC MNTR PHYS/QHP EQP: CPT | Performed by: STUDENT IN AN ORGANIZED HEALTH CARE EDUCATION/TRAINING PROGRAM

## 2025-04-11 PROCEDURE — 3074F SYST BP LT 130 MM HG: CPT | Performed by: STUDENT IN AN ORGANIZED HEALTH CARE EDUCATION/TRAINING PROGRAM

## 2025-04-11 PROCEDURE — 99215 OFFICE O/P EST HI 40 MIN: CPT | Mod: 25 | Performed by: STUDENT IN AN ORGANIZED HEALTH CARE EDUCATION/TRAINING PROGRAM

## 2025-04-11 PROCEDURE — 99211 OFF/OP EST MAY X REQ PHY/QHP: CPT | Performed by: STUDENT IN AN ORGANIZED HEALTH CARE EDUCATION/TRAINING PROGRAM

## 2025-04-11 PROCEDURE — 95251 CONT GLUC MNTR ANALYSIS I&R: CPT | Performed by: STUDENT IN AN ORGANIZED HEALTH CARE EDUCATION/TRAINING PROGRAM

## 2025-04-11 PROCEDURE — 83036 HEMOGLOBIN GLYCOSYLATED A1C: CPT | Performed by: STUDENT IN AN ORGANIZED HEALTH CARE EDUCATION/TRAINING PROGRAM

## 2025-04-11 RX ORDER — GLUCAGON 3 MG/1
3 POWDER NASAL
Qty: 1 EACH | Refills: 11 | Status: SHIPPED | OUTPATIENT
Start: 2025-04-11

## 2025-04-11 RX ORDER — INSULIN ASPART 100 [IU]/ML
INJECTION, SOLUTION INTRAVENOUS; SUBCUTANEOUS
COMMUNITY
End: 2025-04-23

## 2025-04-11 RX ORDER — INSULIN ASPART 100 [IU]/ML
INJECTION, SOLUTION INTRAVENOUS; SUBCUTANEOUS
COMMUNITY
Start: 2025-01-22 | End: 2025-04-23

## 2025-04-11 RX ORDER — INSULIN GLARGINE 100 [IU]/ML
20 INJECTION, SOLUTION SUBCUTANEOUS EVERY EVENING
Qty: 3 ML | Refills: 5 | Status: SHIPPED | OUTPATIENT
Start: 2025-04-11 | End: 2025-04-14

## 2025-04-11 RX ORDER — INSULIN ASPART 100 [IU]/ML
80 INJECTION, SOLUTION INTRAVENOUS; SUBCUTANEOUS CONTINUOUS
Qty: 80 ML | Refills: 4 | Status: SHIPPED | OUTPATIENT
Start: 2025-04-11 | End: 2025-04-14

## 2025-04-11 ASSESSMENT — FIBROSIS 4 INDEX: FIB4 SCORE: 0.3

## 2025-04-11 NOTE — PROGRESS NOTES
Follow up Endocrinology Visit  Last visit with Lavlel Gayle on: 2/15/25  Referred by: RUSSEL Pat    Chief complaint:  Azam Myles, 24 y.o., male, who is here for follow up for T1DM management.    Interval history:   - overall doing well  - puts prandial boluses manually  - does not use sleep mode  - reports midnight lows and morning high BGs  - reviewed recent insulin pump report    Current therapy:  Tandem, Novolog  Period: 2/2/24 - 2/15/24 -> 3/29/25 - 4/11/25  Control IQ:  TDD -   25.59 -> 34.96 units/day => calculated basal -   0.72 units/hr, ISF 1:50, ICR 1:12  Basal - 33 -> 24% -  8.41 -> 8.34  units vs in pump settings -  9.600 units  Bolus - 67 -> 76% -  17.18 -> 26.63 units        Food  - 24 -> 0%        Correction - 5 -> 0%        Override - 49 -> 91%        Control IQ - 21 -> 9%  Bolus/day - 11 -> 9 (manual - 64 -> 64%, control IQ - 36 -> 36%)  CHO - 45 ->0 g/day   Cartridge change - 4.0 -> 3.3 d, tubing fill - 4.0 -> 3.3, cannula fill - q8.0 -> 6.9 d  Sleep regimen - 5 hr x 2 -> 0 weekly  Exercise regimen -  4 hrs x 1  -> 0 weekly    Settings:  Basal 1 - active   Time  - basal rate / ISF / ICR / BG target  12:00 - 0.300 -> 0.25 -> 0.40 / 50/10/110  Total basal insulin - 7.2 units vs actual basal insulin 8.41 units  AIT -  5 hrs    Tolerates medications: well  Compliant: yes    BG control:  CGM type - DexCom G6  Period - 2/2/24 - 2/15/24 -> 3/29/25 - 4/11/25  Data were reviewed and discussed with the patient  Active time  - 100 -> 99%  ABG - 142  -> 138 mg/dL  GV - 28 -> 28%  GMI - 6.7 -> 6.6%  TIR - 84 -> 86%  TAR - high - 13 -> 11%, very high - 2.0 -> 0.9%  TBR - low - 1.1 -> 1.6%, very low - 0.1 -> 1.6%   3/29/25 - 4/11/25      DM history:  - diagnosed in 6/2022, presented with symptomatic hyperglycemia required hospitalization  - hospitalizations for DKA/HHS/severe hyperglycemia/severe hypoglycemia in the past: none  - prior therapy: MDI -> pump x 2.5 years  - known DM  complications:   - latest HbA1C   - pertinent PMHx:   -- HTN  -- denies NAFLD; CAD/PAD/CHF/ CVA    Hypoglycemic episodes:  Hypoglycemic symptoms: yes  Hypoglycemic unawareness: no  Treatment: glucose tablets, juice  Severe hypoglycemia: no  Has medical bracelet/necklace: no  Has glucagon emergency kit: no     Exercise:  Twice a week - gym    Past Medical History:  Patient Active Problem List    Diagnosis Date Noted    Palpitations 06/24/2024    Atrial tachycardia (HCC) 06/24/2024    Essential hypertension, benign 06/24/2024    Rapid heart rate 04/03/2024    Nausea 10/06/2023    Dyslipidemia 08/02/2023    Diabetes (HCC) 06/08/2022    Urinary frequency 06/07/2022    Elevated glucose 06/07/2022    Ketonuria 06/07/2022    Syncope and collapse 03/06/2022    Scoliosis 12/03/2015     Past Surgical History:  Past Surgical History:   Procedure Laterality Date    NO PERTINENT PAST SURGICAL HISTORY        Allergies:  Patient has no known allergies.     Social History:  Social History     Socioeconomic History    Marital status: Single     Spouse name: Not on file    Number of children: Not on file    Years of education: Not on file    Highest education level: Not on file   Occupational History    Not on file   Tobacco Use    Smoking status: Never    Smokeless tobacco: Never   Vaping Use    Vaping status: Never Used   Substance and Sexual Activity    Alcohol use: Not Currently     Comment: rarely    Drug use: Yes     Types: Inhaled, Marijuana    Sexual activity: Yes     Partners: Female     Birth control/protection: Condom   Other Topics Concern    Not on file   Social History Narrative    Not on file     Social Drivers of Health     Financial Resource Strain: Not on file   Food Insecurity: Not on file   Transportation Needs: Not on file   Physical Activity: Not on file   Stress: Not on file   Social Connections: Not on file   Intimate Partner Violence: Not on file   Housing Stability: Not on file      Family History:   Family  "History   Problem Relation Age of Onset    No Known Problems Mother     No Known Problems Father     No Known Problems Sister     Diabetes Maternal Grandmother     Heart Attack Neg Hx     Heart Disease Neg Hx      Current Medications:  Current Outpatient Medications:     insulin lispro (HUMALOG KWIKPEN) 100 UNIT/ML SC SOPN injection PEN, Inject 6 Units under the skin 4 Times a Day,Before Meals and at Bedtime., Disp: 3 mL, Rfl: 3    losartan (COZAAR) 25 MG Tab, Take 1 Tablet by mouth every day., Disp: 90 Tablet, Rfl: 3    Continuous Blood Gluc Transmit (DEXCOM G6 TRANSMITTER) Misc, 1 Applicator every 3 months., Disp: 2 Each, Rfl: 2    Continuous Blood Gluc Sensor (DEXCOM G6 SENSOR) Misc, 1 Applicator every 10 days., Disp: 9 Each, Rfl: 3    Alcohol Swabs, Wipe site with prep pad prior to injection., Disp: 100 Each, Rfl: 0    PHYSICAL EXAM:   Vital signs: /74   Pulse 97   Ht 1.854 m (6' 1\")   Wt 88.3 kg (194 lb 9.6 oz)   SpO2 99%   BMI 25.67 kg/m²   GENERAL: Well-developed, well-nourished  in no apparent distress.   EYE: No ocular and eyelid asymmetry, Anicteric sclerae,  PERRL, No exophthalmos or lidlag  HENT: Hearing grossly intact, Normocephalic, atraumatic.  NECK: Supple. Trachea midline.   CARDIOVASCULAR: Regular rate and rhythm.   LUNGS: No dyspnea  ABDOMEN: Soft, nondistended  EXTREMITIES: No clubbing, cyanosis, or edema.   NEUROLOGICAL: Cranial nerves II-XII are grossly intact No visible tremor with both outstretched hands  SKIN: No rashes, lesions. Turgor is normal.    Labs:  - reviewed  HbA1C/BG/Hb:   Reference Range  08/03/22 08/02/23 07/20/24 4/11/25   HbA1C  4.0 - 5.6 % 8.5 ! (E) 7.5 ! 6.0 (H) 6.2 !      Reference Range  02/10/24    Hb 14.0 - 18.0 g/dL 16.1   Hct 42.0 - 52.0 % 47.1     C-peptide/glucose/T1DM Abs:   Reference Range  06/09/22 07/26/22    Glucose 65 - 99 mg/dL 184 (H)    C-Peptide 0.5 - 3.3 ng/mL  0.4 (L)   IA-2 Abs 0.0 - 7.4 U/mL  >120.0 (H)   GEETHA-65 Abs  0.0 - 5.0 IU/mL  <5.0 "     Kidney function/MACR:   Reference Range  07/20/24    Creatinine 0.50 - 1.40 mg/dL 1.05   GFR (CKD-EPI) >60 mL/min/1.73 m 2 103   MACR 0 - 30 mg/g negative     LFTs/FIB-4:   Reference Range  02/10/24  07/20/24    Platelet Count 164 - 446 K/uL 287    AST(SGOT) 12 - 45 U/L 36 19   ALT(SGPT) 2 - 50 U/L 19 23   ALP 30 - 99 U/L 85 80     Lipid panel:   Reference Range  07/20/24    Triglycerides 0 - 149 mg/dL 36   HDL >=40 mg/dL 57   LDL <100 mg/dL 109 (H)     Hypothyroidism screening:   Reference Range  07/26/22 07/20/24    TSH 0.350 - 5.500  1.430 0.822   fT4 0.93 - 1.70 ng/dL 1.34 1.38     Celiac disease screening:   Reference Range  07/20/24    t-TG IgA 0.00 - 4.99 FLU <1.02   t-TG IgG 0.00 - 4.99 FLU <0.82 (C)   Gliadin Antibodies Iga 0.00 - 4.99 FLU <0.72 (C)   Gliadin Antibodies Igg 0.00 - 4.99 FLU <0.56 (C)     Vit D deficiency:   Reference Range  07/20/24    Vit D 30 - 100 ng/mL 19 (L)     ASSESSMENT/PLAN:   # Well controlled T1DM  - duration x 2.5 years  - on Medtronic insulin pump  - HbA1C - 6.2%     Microvascular complications: denies peripheral neuropathy, nephropathy, retinopathy  Macrovascular complications: denies CAD, CVA, PAD  Associated comorbidities: HTN dyslipidemia     DM complication screening:  Labs reviewed:  MACR - neg, last checked in 7/2024  Creat/GFR , last checked in 7/2024  LDL - 109 - not at target, last checked in  7/2024     Patient is taking statin: no  Patient is taking ASA: no  Patient is taking ACE/ARB: Losartan 25 mg     Last eye exam: not required with T1DM duration x < 5 years  Last foot exam: not required with T1DM duration x < 5 years     Home medications:  TDD -  34.96 units/day => calculated basal -  0.72 units/hr, ISF 1:50, ICR 1:12  Settings:  Basal 1 - active   Time  - basal rate / ISF / ICR / BG target  12:00 - 0.300 -> 0.25 -> 0.40 / 50/10/110  Total basal insulin - 7.2 units vs actual basal insulin 8.41 units  AIT -  5 hrs     Discussion:  - congratulated patient with  excellent glycemic control  - discussed insulin pump use barriers  - discussed with the patient that best way of utilizing insulin pump - put actual CHO rather than giving manual boluses  - noted that bolus insulin >>> basal insulin, likely ICR should be more soft and pt requires higher basal insulin that can help with morning hyperglycemia, also encouraged to start using sleep regimen; episodes of hypoglycemias mostly related to stalking manual insulin boluses     Plan:  Discussed with the patient goals for DM management:  Fasting BG 90 - 130  2 hrs postprandial  - 180  HbA1C < 7.0%     Therapy adjustments:  Basal 1 - active   Time  - basal rate / ISF / ICR / BG target  12:00 - 0.4 -> 0.45 / 50/10 -> 12 /110  Total basal insulin - 9.6  -> 10.8 units   AIT -  5 hrs  - encouraged to report actual CHO    - POCT Hemoglobin A1C  - Glucagon (BAQSIMI ONE PACK) 3 mg/dose; Administer 1 Spray into one nostril 1 time a day as needed (for severe hypoglycemia).  Dispense: 1 Each; Refill: 11  - insulin aspart (NOVOLOG) 100 UNIT/ML Solution; Inject 80 Units under the skin continuous.  Dispense: 80 mL; Refill: 4  - LANTUS SOLOSTAR 100 UNIT/ML Solution Pen-injector injection; Inject 20 Units under the skin every evening.  Dispense: 3 mL; Refill: 5    3. Continue using DexCom G6, will discuss transition to DexCom G7 on upcoming visit.   4. Given instructions for foot care  5. Discussed hypoglycemia symptoms, management, given glucagon prescription.   6. Discussed MDI in case of insulin pump malfunction.     7. Obtain following labs:  - C-PEPTIDE; Future  - Comp Metabolic Panel; Future  - CBC WITH DIFFERENTIAL; Future  - MICROALBUMIN CREAT RATIO URINE; Future  - Lipid Profile; Future  - TSH WITH REFLEX TO FT4; Future  - insulin aspart (NOVOLOG) 100 UNIT/ML Solution; Inject 80 Units under the skin continuous.  Dispense: 80 mL; Refill: 4  - LANTUS SOLOSTAR 100 UNIT/ML Solution Pen-injector injection; Inject 20 Units under the  skin every evening.  Dispense: 3 mL; Refill: 5    RTC: 3 mo    Total time (face-to-face and non-face-to face time): 55 min - discussion of diagnoses, treatment, prognosis, medical charts, lab, imaging, pathology review, documentation.     Plan reviewed with the patient and agreed with plan.  All questions answered to patient's satisfaction.  Thank you kindly for allowing me to participate in the diabetes care plan for this patient.    Carmina Garcia MD    CC:   Brent Michelle D.N.P.

## 2025-04-14 RX ORDER — INSULIN GLARGINE 100 [IU]/ML
20 INJECTION, SOLUTION SUBCUTANEOUS EVERY EVENING
Qty: 15 ML | Refills: 5 | Status: SHIPPED | OUTPATIENT
Start: 2025-04-14 | End: 2025-04-16

## 2025-04-14 RX ORDER — INSULIN ASPART 100 [IU]/ML
80 INJECTION, SOLUTION INTRAVENOUS; SUBCUTANEOUS CONTINUOUS
Qty: 80 ML | Refills: 4 | Status: SHIPPED | OUTPATIENT
Start: 2025-04-14 | End: 2025-04-16

## 2025-04-15 DIAGNOSIS — E10.9 TYPE 1 DIABETES MELLITUS WITHOUT COMPLICATION (HCC): ICD-10-CM

## 2025-04-16 RX ORDER — INSULIN ASPART 100 [IU]/ML
80 INJECTION, SOLUTION INTRAVENOUS; SUBCUTANEOUS CONTINUOUS
Qty: 80 ML | Refills: 4 | Status: SHIPPED
Start: 2025-04-16 | End: 2025-04-23

## 2025-04-16 RX ORDER — INSULIN GLARGINE 100 [IU]/ML
20 INJECTION, SOLUTION SUBCUTANEOUS EVERY EVENING
Qty: 15 ML | Refills: 5 | Status: SHIPPED
Start: 2025-04-16 | End: 2025-04-23 | Stop reason: SDUPTHER

## 2025-04-18 ENCOUNTER — HOSPITAL ENCOUNTER (OUTPATIENT)
Facility: MEDICAL CENTER | Age: 25
End: 2025-04-18
Payer: COMMERCIAL

## 2025-04-18 LAB
H PYLORI AG STL QL IA: NOT DETECTED
HEMOCCULT STL QL: POSITIVE

## 2025-04-18 PROCEDURE — 82653 EL-1 FECAL QUANTITATIVE: CPT

## 2025-04-18 PROCEDURE — 83993 ASSAY FOR CALPROTECTIN FECAL: CPT

## 2025-04-18 PROCEDURE — 87338 HPYLORI STOOL AG IA: CPT

## 2025-04-18 PROCEDURE — 82272 OCCULT BLD FECES 1-3 TESTS: CPT

## 2025-04-18 PROCEDURE — 82705 FATS/LIPIDS FECES QUAL: CPT

## 2025-04-21 LAB
FAT STL QL: NORMAL
NEUTRAL FAT STL QL: NORMAL

## 2025-04-23 ENCOUNTER — PATIENT MESSAGE (OUTPATIENT)
Dept: ENDOCRINOLOGY | Facility: MEDICAL CENTER | Age: 25
End: 2025-04-23
Payer: COMMERCIAL

## 2025-04-23 DIAGNOSIS — E10.9 TYPE 1 DIABETES MELLITUS WITHOUT COMPLICATION (HCC): ICD-10-CM

## 2025-04-23 LAB
CALPROTECTIN STL-MCNT: <5 UG/G
ELASTASE PANC STL-MCNT: 305 UG/G

## 2025-04-23 RX ORDER — INSULIN GLARGINE 100 [IU]/ML
20 INJECTION, SOLUTION SUBCUTANEOUS EVERY EVENING
Qty: 15 ML | Refills: 5 | OUTPATIENT
Start: 2025-04-23

## 2025-04-23 RX ORDER — INSULIN ASPART 100 [IU]/ML
INJECTION, SOLUTION INTRAVENOUS; SUBCUTANEOUS
OUTPATIENT
Start: 2025-04-23

## 2025-04-23 RX ORDER — INSULIN ASPART 100 [IU]/ML
80 INJECTION, SOLUTION INTRAVENOUS; SUBCUTANEOUS CONTINUOUS
Qty: 80 ML | Refills: 4 | Status: SHIPPED | OUTPATIENT
Start: 2025-04-23 | End: 2025-04-28

## 2025-04-23 RX ORDER — INSULIN GLARGINE 100 [IU]/ML
20 INJECTION, SOLUTION SUBCUTANEOUS EVERY EVENING
Qty: 15 ML | Refills: 5 | Status: SHIPPED | OUTPATIENT
Start: 2025-04-23 | End: 2025-04-28

## 2025-04-23 NOTE — TELEPHONE ENCOUNTER
Received request via: Patient    Was the patient seen in the last year in this department? Yes    Does the patient have an active prescription (recently filled or refills available) for medication(s) requested? No    Pharmacy Name: [id number]    Does the patient have correction Plus and need 100-day supply? (This applies to ALL medications) Patient does not have SCP

## 2025-04-24 DIAGNOSIS — E10.9 TYPE 1 DIABETES MELLITUS WITHOUT COMPLICATION (HCC): ICD-10-CM

## 2025-04-28 RX ORDER — INSULIN GLARGINE 100 [IU]/ML
20 INJECTION, SOLUTION SUBCUTANEOUS EVERY EVENING
Qty: 15 ML | Refills: 5 | Status: SHIPPED
Start: 2025-04-28 | End: 2025-04-29 | Stop reason: SDUPTHER

## 2025-04-28 RX ORDER — INSULIN ASPART 100 [IU]/ML
80 INJECTION, SOLUTION INTRAVENOUS; SUBCUTANEOUS CONTINUOUS
Qty: 80 ML | Refills: 4 | Status: SHIPPED
Start: 2025-04-28 | End: 2025-04-29 | Stop reason: SDUPTHER

## 2025-04-29 ENCOUNTER — PATIENT MESSAGE (OUTPATIENT)
Dept: ENDOCRINOLOGY | Facility: MEDICAL CENTER | Age: 25
End: 2025-04-29
Payer: COMMERCIAL

## 2025-04-29 DIAGNOSIS — E10.9 TYPE 1 DIABETES MELLITUS WITHOUT COMPLICATION (HCC): ICD-10-CM

## 2025-04-29 RX ORDER — INSULIN GLARGINE 100 [IU]/ML
20 INJECTION, SOLUTION SUBCUTANEOUS EVERY EVENING
Qty: 15 ML | Refills: 5 | Status: SHIPPED | OUTPATIENT
Start: 2025-04-29

## 2025-04-29 RX ORDER — INSULIN ASPART 100 [IU]/ML
80 INJECTION, SOLUTION INTRAVENOUS; SUBCUTANEOUS CONTINUOUS
Qty: 80 ML | Refills: 4 | Status: SHIPPED | OUTPATIENT
Start: 2025-04-29

## 2025-05-08 DIAGNOSIS — E10.9 TYPE 1 DIABETES MELLITUS WITHOUT COMPLICATION (HCC): ICD-10-CM

## 2025-05-08 NOTE — TELEPHONE ENCOUNTER
Medication Requested: Novolog 100 unit/ml      Insulin pen Or vial? : Vial        Days Supply: 90        Pharmacy: [address]        Number of Refills: 4

## 2025-05-09 DIAGNOSIS — E10.9 TYPE 1 DIABETES MELLITUS WITHOUT COMPLICATION (HCC): ICD-10-CM

## 2025-05-09 RX ORDER — INSULIN ASPART 100 [IU]/ML
80 INJECTION, SOLUTION INTRAVENOUS; SUBCUTANEOUS CONTINUOUS
Qty: 80 ML | Refills: 4 | OUTPATIENT
Start: 2025-05-09

## 2025-06-14 DIAGNOSIS — E10.9 TYPE 1 DIABETES MELLITUS WITHOUT COMPLICATION (HCC): ICD-10-CM

## 2025-06-15 RX ORDER — INSULIN ASPART 100 [IU]/ML
80 INJECTION, SOLUTION INTRAVENOUS; SUBCUTANEOUS CONTINUOUS
Qty: 80 ML | Refills: 4 | Status: SHIPPED | OUTPATIENT
Start: 2025-06-15 | End: 2025-06-27 | Stop reason: SDUPTHER

## 2025-06-15 RX ORDER — INSULIN ASPART 100 [IU]/ML
80 INJECTION, SOLUTION INTRAVENOUS; SUBCUTANEOUS CONTINUOUS
Qty: 80 ML | Refills: 4 | Status: SHIPPED
Start: 2025-06-15 | End: 2025-06-27 | Stop reason: SDUPTHER

## 2025-06-27 DIAGNOSIS — E10.9 TYPE 1 DIABETES MELLITUS WITHOUT COMPLICATION (HCC): ICD-10-CM

## 2025-06-30 RX ORDER — INSULIN ASPART 100 [IU]/ML
80 INJECTION, SOLUTION INTRAVENOUS; SUBCUTANEOUS CONTINUOUS
Qty: 80 ML | Refills: 4 | Status: SHIPPED | OUTPATIENT
Start: 2025-06-30

## 2025-06-30 RX ORDER — INSULIN ASPART 100 [IU]/ML
80 INJECTION, SOLUTION INTRAVENOUS; SUBCUTANEOUS CONTINUOUS
Qty: 80 ML | Refills: 4 | Status: SHIPPED
Start: 2025-06-30

## 2025-07-22 NOTE — PROGRESS NOTES
Follow up Endocrinology Visit  Last visit with Lavell Gayle on: 2/15/25  Last visit with me on: 4/11/25  Referred by: RUSSEL Pat    Chief complaint:  Azam Myles, 24 y.o., male, who is here for follow up for T1DM management.    Interval history:   - overall doing well  - reports actual CHO to the pump  Prior notes:  4/11/25  - overall doing well  - puts prandial boluses manually  - does not use sleep mode  - reports midnight lows and morning high BGs  - reviewed recent insulin pump report    Current therapy:  Tandem, Novolog  Period: 2/2/24 - 2/15/24 -> 3/29/25 - 4/11/25 -> 7/12/25 - 7/25/25  Control IQ: 97%  TDD -   25.59 -> 34.96 -> 26.05 units/day => calculated basal -   0.72  ->  0.54 units/hr, ISF 1:50 -> 1:67, ICR 1:12 -> 1:17  Basal - 33 -> 24 -> 36 % -  8.41 -> 8.34 -> 9.27  units vs in pump settings -  9.600 units  Bolus - 67 -> 76 -> 64 % -  17.18 -> 26.63 ->16.78 units        Food  - 24 -> 0 -> 85%        Correction - 5 -> 0 -> 4%        Override - 49 -> 91 -> 3%        Control IQ - 21 -> 9%  Bolus/day - 11 -> 9 -> 6 (manual - 64 -> 64 -> 68%, control IQ - 36 -> 36 -> 32%)  CHO - 45 ->0 -> 180 g/day   Cartridge change - 4.0 -> 3.3  -> 4.9 d, tubing fill - 4.0 -> 3.3 -> 4.9, cannula fill - q8.0 -> 6.9 d  Sleep regimen - 5 hr x 2 -> 0  -> 8 hr daily  Exercise regimen -  4 hrs x 1 weekly    Settings:  Basal 2 - active   Time  - basal rate / ISF / ICR / BG target  12:00 - 0.4 / 45 / 12 /110  Total basal insulin - 9.6   AIT -  5 hrs    Basal 1 - inactive   Time  - basal rate / ISF / ICR / BG target  12:00 - 0.300 -> 0.25 -> 0.40 / 50/10/110  Total basal insulin - 9.6  AIT -  5 hrs    Tolerates medications: well  Compliant: yes    BG control:  CGM type - DexCom G6  Period - 2/2/24 - 2/15/24 -> 3/29/25 - 4/11/25 -> 7/12/25 - 7/25/25  Data were reviewed and discussed with the patient  Active time  - 100 -> 99%  ABG - 142  -> 138 mg/dL  GV - 28 -> 28%  GMI - 6.7 -> 6.6%  TIR - 84 ->  86%  TAR - high - 13 -> 11%, very high - 2.0 -> 0.9%  TBR - low - 1.1 -> 1.6%, very low - 0.1 -> 1.6%  7/12/25 - 7/25/25    3/29/25 - 4/11/25      DM history:  - diagnosed in 6/2022, presented with symptomatic hyperglycemia required hospitalization  - hospitalizations for DKA/HHS/severe hyperglycemia/severe hypoglycemia in the past: none  - prior therapy: MDI -> pump x 2.5 years  - known DM complications:   - latest HbA1C   - pertinent PMHx:   -- HTN  -- denies NAFLD; CAD/PAD/CHF/ CVA    Hypoglycemic episodes:  Hypoglycemic symptoms: yes  Hypoglycemic unawareness: no  Treatment: glucose tablets, juice  Severe hypoglycemia: no  Has medical bracelet/necklace: no  Has glucagon emergency kit: no     Exercise:  Twice a week - gym    Current Medications:  Current Outpatient Medications:     insulin aspart (NOVOLOG) 100 UNIT/ML Solution, Inject 80 Units under the skin continuous., Disp: 80 mL, Rfl: 4    insulin aspart (NOVOLOG) 100 UNIT/ML Solution, Inject 80 Units under the skin continuous., Disp: 80 mL, Rfl: 4    LANTUS SOLOSTAR 100 UNIT/ML Solution Pen-injector injection, Inject 20 Units under the skin every evening., Disp: 3 mL, Rfl: 5    LANTUS SOLOSTAR 100 UNIT/ML Solution Pen-injector injection, Inject 20 Units under the skin every evening., Disp: 15 mL, Rfl: 5    Glucagon (BAQSIMI ONE PACK) 3 mg/dose, Administer 1 Spray into one nostril 1 time a day as needed (for severe hypoglycemia)., Disp: 1 Each, Rfl: 11    insulin lispro (HUMALOG KWIKPEN) 100 UNIT/ML SC SOPN injection PEN, Inject 6 Units under the skin 4 Times a Day,Before Meals and at Bedtime. (Patient not taking: Reported on 4/11/2025), Disp: 3 mL, Rfl: 3    losartan (COZAAR) 25 MG Tab, Take 1 Tablet by mouth every day., Disp: 90 Tablet, Rfl: 3    Continuous Blood Gluc Transmit (DEXCOM G6 TRANSMITTER) Misc, 1 Applicator every 3 months., Disp: 2 Each, Rfl: 2    Continuous Blood Gluc Sensor (DEXCOM G6 SENSOR) Misc, 1 Applicator every 10 days., Disp: 9 Each,  "Rfl: 3    Alcohol Swabs, Wipe site with prep pad prior to injection., Disp: 100 Each, Rfl: 0    PHYSICAL EXAM:   Vital signs: /76   Pulse 87   Ht 1.854 m (6' 1\") Comment: pt stated  Wt 86 kg (189 lb 8 oz)   SpO2 99%   BMI 25.00 kg/m²   GENERAL: Well-developed, well-nourished  in no apparent distress.   CARDIOVASCULAR: Regular rate and rhythm.   LUNGS: No dyspnea  ABDOMEN: Soft, nondistended  EXTREMITIES: No clubbing, cyanosis, or edema.   NEUROLOGICAL: Cranial nerves II-XII are grossly intact No visible tremor with both outstretched hands  SKIN: No rashes, lesions. Turgor is normal.    Labs:  - reviewed  - C-PEPTIDE; Future  - Comp Metabolic Panel; Future  - CBC WITH DIFFERENTIAL; Future  - MICROALBUMIN CREAT RATIO URINE; Future  - Lipid Profile; Future  - TSH WITH REFLEX TO FT4; Future  HbA1C/BG/Hb:   Reference Range  08/03/22 08/02/23 07/20/24 4/11/25 7/25/25   HbA1C  4.0 - 5.6 % 8.5 ! (E) 7.5 ! 6.0 (H) 6.2 ! 6.1 !      Reference Range  02/10/24    Hb 14.0 - 18.0 g/dL 16.1   Hct 42.0 - 52.0 % 47.1     C-peptide/glucose/T1DM Abs:   Reference Range  06/09/22 07/26/22    Glucose 65 - 99 mg/dL 184 (H)    C-Peptide 0.5 - 3.3 ng/mL  0.4 (L)   IA-2 Abs 0.0 - 7.4 U/mL  >120.0 (H)   GEETHA-65 Abs  0.0 - 5.0 IU/mL  <5.0     Kidney function/MACR:   Reference Range  07/20/24    Creatinine 0.50 - 1.40 mg/dL 1.05   GFR (CKD-EPI) >60 mL/min/1.73 m 2 103   MACR 0 - 30 mg/g negative     LFTs/FIB-4:   Reference Range  02/10/24  07/20/24    Platelet Count 164 - 446 K/uL 287    AST(SGOT) 12 - 45 U/L 36 19   ALT(SGPT) 2 - 50 U/L 19 23   ALP 30 - 99 U/L 85 80     Lipid panel:   Reference Range  07/20/24    Triglycerides 0 - 149 mg/dL 36   HDL >=40 mg/dL 57   LDL <100 mg/dL 109 (H)     Hypothyroidism screening:   Reference Range  07/26/22 07/20/24    TSH 0.350 - 5.500  1.430 0.822   fT4 0.93 - 1.70 ng/dL 1.34 1.38     Celiac disease screening:   Reference Range  07/20/24    t-TG IgA 0.00 - 4.99 FLU <1.02   t-TG IgG 0.00 - " 4.99 FLU <0.82 (C)   Gliadin Antibodies Iga 0.00 - 4.99 FLU <0.72 (C)   Gliadin Antibodies Igg 0.00 - 4.99 FLU <0.56 (C)     Vit D deficiency:   Reference Range  07/20/24    Vit D 30 - 100 ng/mL 19 (L)     ASSESSMENT/PLAN:   # Well controlled T1DM  - duration x 2.5 years  - on Medtronic insulin pump  - HbA1C - 6.2%     Microvascular complications: denies peripheral neuropathy, nephropathy, retinopathy  Macrovascular complications: denies CAD, CVA, PAD  Associated comorbidities: HTN dyslipidemia     DM complication screening:  Labs reviewed:  MACR - neg, last checked in 7/2024  Creat/GFR , last checked in 7/2024  LDL - 109 - not at target, last checked in  7/2024     Patient is taking statin: no  Patient is taking ASA: no  Patient is taking ACE/ARB: Losartan 25 mg     Last eye exam: 7/25/25 indeterminate  Last foot exam: not required with T1DM duration x < 5 years     Home medications:  TDD -  34.96 -> 26.05 units/day => calculated basal -  0.72  ->  0.54 units/hr, ISF 1:50 -> 1:67, ICR 1:12 -> 1:17  Settings:  Basal 2 - active   Time  - basal rate / ISF / ICR / BG target  12:00 - 0.4 / 45 / 12 /110  Total basal insulin - 9.6   AIT -  5 hrs     Discussion:  - congratulated patient with excellent glycemic control, reporting all his CHO  Prior notes:  4/11/25  - congratulated patient with excellent glycemic control  - discussed insulin pump use barriers  - discussed with the patient that best way of utilizing insulin pump - put actual CHO rather than giving manual boluses  - noted that bolus insulin >>> basal insulin, likely ICR should be more soft and pt requires higher basal insulin that can help with morning hyperglycemia, also encouraged to start using sleep regimen; episodes of hypoglycemias mostly related to stalking manual insulin boluses     Plan:  Discussed with the patient goals for DM management:  Fasting BG 90 - 130  2 hrs postprandial  - 180  HbA1C < 7.0%     Therapy adjustments:  - none  - continue  to report actual CHO    3. Continue using DexCom G6, will discuss transition to DexCom G7 on upcoming visit.   4. Given instructions for foot care  5. Discussed hypoglycemia symptoms, management, given glucagon prescription.   6. Discussed MDI in case of insulin pump malfunction.     7. Obtain following labs:  - C-PEPTIDE; Future  - Comp Metabolic Panel; Future  - CBC WITH DIFFERENTIAL; Future  - MICROALBUMIN CREAT RATIO URINE; Future  - Lipid Profile; Future  - TSH WITH REFLEX TO FT4; Future  - insulin aspart (NOVOLOG) 100 UNIT/ML Solution; Inject 80 Units under the skin continuous.  Dispense: 80 mL; Refill: 4  - LANTUS SOLOSTAR 100 UNIT/ML Solution Pen-injector injection; Inject 20 Units under the skin every evening.  Dispense: 3 mL; Refill: 5    RTC: 6 mo  Plan reviewed with the patient and agreed with plan.  All questions answered to patient's satisfaction.  Thank you kindly for allowing me to participate in the diabetes care plan for this patient.    Carmina Garcia MD    CC:   Brent Michelle D.N.P.

## 2025-07-25 ENCOUNTER — OFFICE VISIT (OUTPATIENT)
Dept: ENDOCRINOLOGY | Facility: MEDICAL CENTER | Age: 25
End: 2025-07-25
Attending: STUDENT IN AN ORGANIZED HEALTH CARE EDUCATION/TRAINING PROGRAM
Payer: COMMERCIAL

## 2025-07-25 VITALS
SYSTOLIC BLOOD PRESSURE: 118 MMHG | DIASTOLIC BLOOD PRESSURE: 76 MMHG | HEART RATE: 87 BPM | WEIGHT: 189.5 LBS | OXYGEN SATURATION: 99 % | HEIGHT: 73 IN | BODY MASS INDEX: 25.12 KG/M2

## 2025-07-25 DIAGNOSIS — E10.9 WELL CONTROLLED TYPE 1 DIABETES MELLITUS (HCC): Primary | ICD-10-CM

## 2025-07-25 LAB
HBA1C MFR BLD: 6.1 % (ref ?–5.8)
POCT INT CON NEG: NEGATIVE
POCT INT CON POS: POSITIVE
RETINAL SCREEN: NORMAL

## 2025-07-25 PROCEDURE — 83036 HEMOGLOBIN GLYCOSYLATED A1C: CPT | Performed by: STUDENT IN AN ORGANIZED HEALTH CARE EDUCATION/TRAINING PROGRAM

## 2025-07-25 PROCEDURE — 99214 OFFICE O/P EST MOD 30 MIN: CPT | Performed by: STUDENT IN AN ORGANIZED HEALTH CARE EDUCATION/TRAINING PROGRAM

## 2025-07-25 PROCEDURE — 99213 OFFICE O/P EST LOW 20 MIN: CPT | Performed by: STUDENT IN AN ORGANIZED HEALTH CARE EDUCATION/TRAINING PROGRAM

## 2025-07-25 PROCEDURE — 3074F SYST BP LT 130 MM HG: CPT | Performed by: STUDENT IN AN ORGANIZED HEALTH CARE EDUCATION/TRAINING PROGRAM

## 2025-07-25 PROCEDURE — 3078F DIAST BP <80 MM HG: CPT | Performed by: STUDENT IN AN ORGANIZED HEALTH CARE EDUCATION/TRAINING PROGRAM

## 2025-07-25 PROCEDURE — 95251 CONT GLUC MNTR ANALYSIS I&R: CPT | Performed by: STUDENT IN AN ORGANIZED HEALTH CARE EDUCATION/TRAINING PROGRAM

## 2025-07-25 PROCEDURE — 95250 CONT GLUC MNTR PHYS/QHP EQP: CPT | Performed by: STUDENT IN AN ORGANIZED HEALTH CARE EDUCATION/TRAINING PROGRAM

## 2025-07-25 RX ORDER — ONDANSETRON 4 MG/1
TABLET, ORALLY DISINTEGRATING ORAL
COMMUNITY

## 2025-07-25 RX ORDER — POLYETHYLENE GLYCOL-3350 AND ELECTROLYTES 236; 6.74; 5.86; 2.97; 22.74 G/274.31G; G/274.31G; G/274.31G; G/274.31G; G/274.31G
POWDER, FOR SOLUTION ORAL
COMMUNITY
Start: 2025-05-02

## 2025-07-25 RX ORDER — LACTULOSE 10 G/15ML
SOLUTION ORAL
COMMUNITY
Start: 2025-07-18

## 2025-07-25 RX ORDER — FAMOTIDINE 40 MG/1
TABLET, FILM COATED ORAL
COMMUNITY

## 2025-07-25 ASSESSMENT — FIBROSIS 4 INDEX: FIB4 SCORE: 0.3

## 2025-08-01 LAB — RETINAL SCREEN: NEGATIVE

## 2025-08-04 ENCOUNTER — APPOINTMENT (OUTPATIENT)
Dept: MEDICAL GROUP | Facility: PHYSICIAN GROUP | Age: 25
End: 2025-08-04
Payer: COMMERCIAL

## 2025-08-04 PROBLEM — L74.510 HYPERHIDROSIS OF AXILLA: Status: ACTIVE | Noted: 2025-08-04

## 2025-08-04 ASSESSMENT — ENCOUNTER SYMPTOMS
HEADACHES: 0
WEAKNESS: 0
BLURRED VISION: 0
ABDOMINAL PAIN: 0
DIZZINESS: 0
NAUSEA: 0
CONSTIPATION: 0
DIARRHEA: 0
MYALGIAS: 0
VOMITING: 0
COUGH: 0
FEVER: 0
SHORTNESS OF BREATH: 0
CHILLS: 0
WEIGHT LOSS: 0

## 2025-08-04 ASSESSMENT — PATIENT HEALTH QUESTIONNAIRE - PHQ9: CLINICAL INTERPRETATION OF PHQ2 SCORE: 0

## 2025-08-04 ASSESSMENT — FIBROSIS 4 INDEX: FIB4 SCORE: 0.3

## 2025-08-11 RX ORDER — INSULIN ASPART 100 [IU]/ML
80 INJECTION, SOLUTION INTRAVENOUS; SUBCUTANEOUS CONTINUOUS
Qty: 80 ML | Refills: 4 | Status: SHIPPED | OUTPATIENT
Start: 2025-08-11

## 2025-08-22 ENCOUNTER — HOSPITAL ENCOUNTER (OUTPATIENT)
Dept: RADIOLOGY | Facility: MEDICAL CENTER | Age: 25
End: 2025-08-22
Payer: COMMERCIAL

## 2025-08-22 ENCOUNTER — APPOINTMENT (OUTPATIENT)
Dept: RADIOLOGY | Facility: MEDICAL CENTER | Age: 25
End: 2025-08-22
Payer: COMMERCIAL

## 2025-08-22 DIAGNOSIS — R10.84 GENERALIZED ABDOMINAL PAIN: ICD-10-CM

## 2025-08-22 DIAGNOSIS — R19.7 NAUSEA, VOMITING AND DIARRHEA: ICD-10-CM

## 2025-08-22 DIAGNOSIS — R11.2 NAUSEA, VOMITING AND DIARRHEA: ICD-10-CM

## 2025-08-22 PROCEDURE — 76700 US EXAM ABDOM COMPLETE: CPT

## 2025-08-29 ENCOUNTER — HOSPITAL ENCOUNTER (OUTPATIENT)
Dept: RADIOLOGY | Facility: MEDICAL CENTER | Age: 25
End: 2025-08-29
Payer: COMMERCIAL

## 2025-08-29 DIAGNOSIS — R19.7 NAUSEA, VOMITING AND DIARRHEA: ICD-10-CM

## 2025-08-29 DIAGNOSIS — R11.2 NAUSEA, VOMITING AND DIARRHEA: ICD-10-CM

## 2025-08-29 DIAGNOSIS — R10.84 GENERALIZED ABDOMINAL PAIN: ICD-10-CM

## 2025-08-29 PROCEDURE — A9537 TC99M MEBROFENIN: HCPCS

## 2025-09-17 ENCOUNTER — APPOINTMENT (OUTPATIENT)
Dept: MEDICAL GROUP | Facility: PHYSICIAN GROUP | Age: 25
End: 2025-09-17
Payer: COMMERCIAL